# Patient Record
Sex: FEMALE | Race: ASIAN | NOT HISPANIC OR LATINO | Employment: UNEMPLOYED | URBAN - METROPOLITAN AREA
[De-identification: names, ages, dates, MRNs, and addresses within clinical notes are randomized per-mention and may not be internally consistent; named-entity substitution may affect disease eponyms.]

---

## 2017-08-19 ENCOUNTER — HOSPITAL ENCOUNTER (INPATIENT)
Facility: MEDICAL CENTER | Age: 32
LOS: 1 days | End: 2017-08-20
Attending: OBSTETRICS & GYNECOLOGY | Admitting: OBSTETRICS & GYNECOLOGY
Payer: COMMERCIAL

## 2017-08-19 LAB
BASOPHILS # BLD AUTO: 0.3 % (ref 0–1.8)
BASOPHILS # BLD: 0.05 K/UL (ref 0–0.12)
EOSINOPHIL # BLD AUTO: 0.1 K/UL (ref 0–0.51)
EOSINOPHIL NFR BLD: 0.6 % (ref 0–6.9)
ERYTHROCYTE [DISTWIDTH] IN BLOOD BY AUTOMATED COUNT: 41 FL (ref 35.9–50)
ERYTHROCYTE [DISTWIDTH] IN BLOOD BY AUTOMATED COUNT: 41 FL (ref 35.9–50)
HCT VFR BLD AUTO: 29 % (ref 37–47)
HCT VFR BLD AUTO: 33.9 % (ref 37–47)
HGB BLD-MCNC: 11 G/DL (ref 12–16)
HGB BLD-MCNC: 9.3 G/DL (ref 12–16)
HOLDING TUBE BB 8507: NORMAL
IMM GRANULOCYTES # BLD AUTO: 0.18 K/UL (ref 0–0.11)
IMM GRANULOCYTES NFR BLD AUTO: 1.1 % (ref 0–0.9)
LYMPHOCYTES # BLD AUTO: 3.04 K/UL (ref 1–4.8)
LYMPHOCYTES NFR BLD: 18.8 % (ref 22–41)
MCH RBC QN AUTO: 25.5 PG (ref 27–33)
MCH RBC QN AUTO: 25.6 PG (ref 27–33)
MCHC RBC AUTO-ENTMCNC: 32.1 G/DL (ref 33.6–35)
MCHC RBC AUTO-ENTMCNC: 32.4 G/DL (ref 33.6–35)
MCV RBC AUTO: 78.8 FL (ref 81.4–97.8)
MCV RBC AUTO: 79.5 FL (ref 81.4–97.8)
MONOCYTES # BLD AUTO: 0.87 K/UL (ref 0–0.85)
MONOCYTES NFR BLD AUTO: 5.4 % (ref 0–13.4)
NEUTROPHILS # BLD AUTO: 11.95 K/UL (ref 2–7.15)
NEUTROPHILS NFR BLD: 73.8 % (ref 44–72)
NRBC # BLD AUTO: 0 K/UL
NRBC BLD AUTO-RTO: 0 /100 WBC
PLATELET # BLD AUTO: 241 K/UL (ref 164–446)
PLATELET # BLD AUTO: 283 K/UL (ref 164–446)
PMV BLD AUTO: 9.5 FL (ref 9–12.9)
PMV BLD AUTO: 9.7 FL (ref 9–12.9)
RBC # BLD AUTO: 3.65 M/UL (ref 4.2–5.4)
RBC # BLD AUTO: 4.3 M/UL (ref 4.2–5.4)
WBC # BLD AUTO: 16.2 K/UL (ref 4.8–10.8)
WBC # BLD AUTO: 16.2 K/UL (ref 4.8–10.8)

## 2017-08-19 PROCEDURE — 0KQM0ZZ REPAIR PERINEUM MUSCLE, OPEN APPROACH: ICD-10-PCS | Performed by: OBSTETRICS & GYNECOLOGY

## 2017-08-19 PROCEDURE — 700101 HCHG RX REV CODE 250

## 2017-08-19 PROCEDURE — 700111 HCHG RX REV CODE 636 W/ 250 OVERRIDE (IP)

## 2017-08-19 PROCEDURE — 700112 HCHG RX REV CODE 229: Performed by: OBSTETRICS & GYNECOLOGY

## 2017-08-19 PROCEDURE — A9270 NON-COVERED ITEM OR SERVICE: HCPCS | Performed by: OBSTETRICS & GYNECOLOGY

## 2017-08-19 PROCEDURE — 85025 COMPLETE CBC W/AUTO DIFF WBC: CPT

## 2017-08-19 PROCEDURE — 36415 COLL VENOUS BLD VENIPUNCTURE: CPT

## 2017-08-19 PROCEDURE — 700102 HCHG RX REV CODE 250 W/ 637 OVERRIDE(OP): Performed by: OBSTETRICS & GYNECOLOGY

## 2017-08-19 PROCEDURE — 770002 HCHG ROOM/CARE - OB PRIVATE (112)

## 2017-08-19 PROCEDURE — 303615 HCHG EPIDURAL/SPINAL ANESTHESIA FOR LABOR

## 2017-08-19 PROCEDURE — 85027 COMPLETE CBC AUTOMATED: CPT

## 2017-08-19 PROCEDURE — 700111 HCHG RX REV CODE 636 W/ 250 OVERRIDE (IP): Performed by: OBSTETRICS & GYNECOLOGY

## 2017-08-19 PROCEDURE — 700105 HCHG RX REV CODE 258: Performed by: OBSTETRICS & GYNECOLOGY

## 2017-08-19 PROCEDURE — 59409 OBSTETRICAL CARE: CPT

## 2017-08-19 PROCEDURE — 304965 HCHG RECOVERY SERVICES

## 2017-08-19 PROCEDURE — 4A1HXCZ MONITORING OF PRODUCTS OF CONCEPTION, CARDIAC RATE, EXTERNAL APPROACH: ICD-10-PCS | Performed by: OBSTETRICS & GYNECOLOGY

## 2017-08-19 RX ORDER — VITAMIN A ACETATE, BETA CAROTENE, ASCORBIC ACID, CHOLECALCIFEROL, .ALPHA.-TOCOPHEROL ACETATE, DL-, THIAMINE MONONITRATE, RIBOFLAVIN, NIACINAMIDE, PYRIDOXINE HYDROCHLORIDE, FOLIC ACID, CYANOCOBALAMIN, CALCIUM CARBONATE, FERROUS FUMARATE, ZINC OXIDE, CUPRIC OXIDE 3080; 12; 120; 400; 1; 1.84; 3; 20; 22; 920; 25; 200; 27; 10; 2 [IU]/1; UG/1; MG/1; [IU]/1; MG/1; MG/1; MG/1; MG/1; MG/1; [IU]/1; MG/1; MG/1; MG/1; MG/1; MG/1
1 TABLET, FILM COATED ORAL EVERY MORNING
Status: DISCONTINUED | OUTPATIENT
Start: 2017-08-19 | End: 2017-08-20 | Stop reason: HOSPADM

## 2017-08-19 RX ORDER — SODIUM CHLORIDE, SODIUM LACTATE, POTASSIUM CHLORIDE, CALCIUM CHLORIDE 600; 310; 30; 20 MG/100ML; MG/100ML; MG/100ML; MG/100ML
INJECTION, SOLUTION INTRAVENOUS CONTINUOUS
Status: DISCONTINUED | OUTPATIENT
Start: 2017-08-19 | End: 2017-08-19 | Stop reason: HOSPADM

## 2017-08-19 RX ORDER — IBUPROFEN 600 MG/1
600 TABLET ORAL EVERY 6 HOURS PRN
Status: DISCONTINUED | OUTPATIENT
Start: 2017-08-19 | End: 2017-08-20 | Stop reason: HOSPADM

## 2017-08-19 RX ORDER — IBUPROFEN 600 MG/1
600 TABLET ORAL EVERY 6 HOURS PRN
Status: DISCONTINUED | OUTPATIENT
Start: 2017-08-19 | End: 2017-08-19

## 2017-08-19 RX ORDER — OXYCODONE HYDROCHLORIDE AND ACETAMINOPHEN 5; 325 MG/1; MG/1
1 TABLET ORAL EVERY 4 HOURS PRN
Status: DISCONTINUED | OUTPATIENT
Start: 2017-08-19 | End: 2017-08-20 | Stop reason: HOSPADM

## 2017-08-19 RX ORDER — SODIUM CHLORIDE, SODIUM LACTATE, POTASSIUM CHLORIDE, CALCIUM CHLORIDE 600; 310; 30; 20 MG/100ML; MG/100ML; MG/100ML; MG/100ML
INJECTION, SOLUTION INTRAVENOUS PRN
Status: DISCONTINUED | OUTPATIENT
Start: 2017-08-19 | End: 2017-08-20 | Stop reason: HOSPADM

## 2017-08-19 RX ORDER — MISOPROSTOL 200 UG/1
800 TABLET ORAL
Status: DISCONTINUED | OUTPATIENT
Start: 2017-08-19 | End: 2017-08-20 | Stop reason: HOSPADM

## 2017-08-19 RX ORDER — DOCUSATE SODIUM 100 MG/1
100 CAPSULE, LIQUID FILLED ORAL 2 TIMES DAILY PRN
Status: DISCONTINUED | OUTPATIENT
Start: 2017-08-19 | End: 2017-08-20 | Stop reason: HOSPADM

## 2017-08-19 RX ORDER — MISOPROSTOL 200 UG/1
800 TABLET ORAL
Status: DISCONTINUED | OUTPATIENT
Start: 2017-08-19 | End: 2017-08-19 | Stop reason: HOSPADM

## 2017-08-19 RX ORDER — OXYCODONE HYDROCHLORIDE AND ACETAMINOPHEN 5; 325 MG/1; MG/1
2 TABLET ORAL EVERY 4 HOURS PRN
Status: DISCONTINUED | OUTPATIENT
Start: 2017-08-19 | End: 2017-08-20 | Stop reason: HOSPADM

## 2017-08-19 RX ORDER — ROPIVACAINE HYDROCHLORIDE 2 MG/ML
INJECTION, SOLUTION EPIDURAL; INFILTRATION; PERINEURAL
Status: COMPLETED
Start: 2017-08-19 | End: 2017-08-19

## 2017-08-19 RX ADMIN — SODIUM CHLORIDE, POTASSIUM CHLORIDE, SODIUM LACTATE AND CALCIUM CHLORIDE: 600; 310; 30; 20 INJECTION, SOLUTION INTRAVENOUS at 03:30

## 2017-08-19 RX ADMIN — ROPIVACAINE HYDROCHLORIDE 100 ML: 2 INJECTION, SOLUTION EPIDURAL; INFILTRATION at 04:34

## 2017-08-19 RX ADMIN — OXYTOCIN 1 MILLI-UNITS/MIN: 10 INJECTION, SOLUTION INTRAMUSCULAR; INTRAVENOUS at 06:02

## 2017-08-19 RX ADMIN — OXYCODONE HYDROCHLORIDE AND ACETAMINOPHEN 1 TABLET: 5; 325 TABLET ORAL at 21:33

## 2017-08-19 RX ADMIN — FENTANYL CITRATE 100 MCG: 50 INJECTION, SOLUTION INTRAMUSCULAR; INTRAVENOUS at 03:49

## 2017-08-19 RX ADMIN — OXYCODONE HYDROCHLORIDE AND ACETAMINOPHEN 1 TABLET: 5; 325 TABLET ORAL at 11:03

## 2017-08-19 RX ADMIN — IBUPROFEN 600 MG: 600 TABLET, FILM COATED ORAL at 21:32

## 2017-08-19 RX ADMIN — IBUPROFEN 600 MG: 600 TABLET, FILM COATED ORAL at 11:03

## 2017-08-19 RX ADMIN — SODIUM CHLORIDE, POTASSIUM CHLORIDE, SODIUM LACTATE AND CALCIUM CHLORIDE: 600; 310; 30; 20 INJECTION, SOLUTION INTRAVENOUS at 04:36

## 2017-08-19 RX ADMIN — Medication 125 ML/HR: at 09:52

## 2017-08-19 RX ADMIN — DOCUSATE SODIUM 100 MG: 100 CAPSULE ORAL at 11:03

## 2017-08-19 RX ADMIN — Medication 1 TABLET: at 11:03

## 2017-08-19 ASSESSMENT — LIFESTYLE VARIABLES
EVER_SMOKED: NEVER
ALCOHOL_USE: NO
DO YOU DRINK ALCOHOL: NO

## 2017-08-19 ASSESSMENT — PAIN SCALES - GENERAL
PAINLEVEL_OUTOF10: 8
PAINLEVEL_OUTOF10: 0
PAINLEVEL_OUTOF10: 2
PAINLEVEL_OUTOF10: 8
PAINLEVEL_OUTOF10: 0

## 2017-08-19 NOTE — H&P
HISTORY OF PRESENT ILLNESS:  Patient is a private patient of Dr. Laquita Adamson,   a 31-year-old  2, para 1, who presents at 39-3/7 weeks gestation in   active labor.  Patient was pascual, but has been really changed her cervix   and so walk for an hour and then changed her cervix and was admitted.  She   received epidural anesthesia, she went on to become complete without   augmentation soon thereafter.  Group B strep is negative.    Her prenatal course was fairly uneventful.  She declined all screening, had a   normal survey, had a normal 1-hour Glucola.  Group B strep was negative.  She   received Tdap during in the prenatal course.    PAST MEDICAL HISTORY:  Only significant for acid reflux.    CURRENT MEDICATIONS:  Include only Tums and prenatal vitamins.    SOCIAL HISTORY:  She is .  No alcohol, tobacco or history of drug use.    GYNECOLOGIC HISTORY:  Noncontributory.  No history of herpes simplex virus.    OBSTETRICAL HISTORY:  She has had 1 spontaneous vaginal delivery, 6 pounds 11   ounces.  No complications.    PAST SURGICAL HISTORY:  None.    PHYSICAL EXAMINATION:  VITAL SIGNS:  Stable.  She is afebrile.  CARDIOVASCULAR:  Regular rate and rhythm.  CHEST:  Clear to auscultation bilaterally.  ABDOMEN:  Gravid, nontender, nondistended.  Normal bowel sounds.  EXTREMITIES:  No cyanosis, clubbing or edema.  PELVIC:  Sterile vaginal exam, she was 2 cm upon arrival and changed to 4 cm.    Fetal heart tones were firm to more reactive, reassuring, category 1,   moderate variability, no decelerations.  She was pascual about every 5   minutes.    LABORATORY DATA:  She is Rh positive, rubella is immune, RPR nonreactive,   hepatitis B surface antigen negative, HIV is nonreactive.  Group B strep is   negative.  One-hour Glucola was normal and she declined all  testing.    ASSESSMENT AND PLAN:  A 31-year-old  2, para 1 at 39+ weeks gestation:  1.  Active labor, she was admitted, desired  epidural.  Anticipate spontaneous   vaginal delivery.  Fetal tracing is overall reactive, reassuring.       ____________________________________     MD SERGE GONZALEZ / VALENTINA    DD:  08/19/2017 06:54:38  DT:  08/19/2017 07:08:31    D#:  [id number]  Job#:  [id number]

## 2017-08-19 NOTE — PROGRESS NOTES
0324 Assumed care of patient.     0420 Anesthesia called for epidural placement. Test dose given at 0426. Pt tolerated well, no complications noted at this time.     0619 Dr. De Los Santos called for delivery.     0621 Viable male infant delivered. APGARS 8/9. Mom and baby tolerated well. Resting at this time.

## 2017-08-19 NOTE — PROGRESS NOTES
EDC 8/23/2017  EGA 39w3d    0040: TOCO/US applied, vital signs WDL. Pt presents with c/o of contractions that started about   Pt declines any vaginal bleeding, LOF, and states +FM. SVE 2/100/-1.     0100: Dr. De Los Santos notified with labor status. Orders given to have patient walk the halls for an hour and recheck cervix after walking.   0127: Pt taken off monitors and oriented to areas to walk the hallways.   0236: SVE 4-5/100/-1  0240: Dr. De Los Santos updated with labor status. Orders given to admit patient. Labor orders given.   0320: Report given to GIORGIO Arroyo RN.

## 2017-08-19 NOTE — PROGRESS NOTES
@0820 Pt up to bathroom with standby assist - steady gait.  Pericare done, pads applied.  Pt to PP @0830 via w/c with all personal belongings.  Report to Sobia BLACK. pt. care cont.

## 2017-08-19 NOTE — DELIVERY NOTE
DATE OF SERVICE:  2017    PREOPERATIVE DIAGNOSES:  1.  Intrauterine pregnancy at term.  2.  Active labor.    POSTOPERATIVE DIAGNOSES:  1.  Intrauterine pregnancy at term.  2.  Active labor.    PROCEDURE:  Normal spontaneous vaginal delivery.    DELIVERING PHYSICIAN:  Leonor De Los Santos MD.    ANESTHESIA:  Epidural.    ANESTHESIOLOGIST:  Sid Fonseca DO.    COMPLICATIONS:  None.    ESTIMATED BLOOD LOSS:  250 mL    FINDINGS:  Male infant, cephalic presentation, clear amniotic fluid, nuchal   cord x1, loose and delivered through, Apgars 8 and 9.  Weight is still pending   at the time of this dictation.  Secondary midline laceration was repaired   with Vicryl.    HOSPITAL COURSE:  Patient arrived and once she changed her cervix, she was admitted.  She   received epidural anesthesia.  She went fairly quickly to complete.  She had   spontaneous rupture of membranes, clear fluid, and pushed for about 20 minutes   and delivered a vigorous male infant over a second-degree midline laceration.    Once the anterior shoulder delivered, the rest of the baby delivered   uneventfully.  There was a nuchal cord that was loose and reduced after   delivery.  Nose and mouth were bulb suctioned.  Baby was placed on maternal   abdomen with awaiting delivery team nurse.  Delayed for cord clamp.  The cord   was clamped x2 and cut by father of the baby.  The placenta delivered   spontaneously intact, 3-vessel cord.  Second-degree laceration was repaired   with Vicryl without any complications or difficulty.  Rectum and sphincter   intact.  Mother and baby are both doing well.  Mother will go to postpartum,   baby to  nursery.       ____________________________________     MD SERGE GONZALEZ / NTS    DD:  2017 06:56:46  DT:  2017 07:09:09    D#:  [id number]  Job#:  [id number]

## 2017-08-19 NOTE — IP AVS SNAPSHOT
Produce Run Access Code: Activation code not generated  Current Produce Run Status: Active    [employer]  A secure, online tool to manage your health information     [employer]’s Produce Run® is a secure, online tool that connects you to your personalized health information from the privacy of your home -- day or night - making it very easy for you to manage your healthcare. Once the activation process is completed, you can even access your medical information using the Produce Run alberto, which is available for free in the Apple Alberto store or Google Play store.     Produce Run provides the following levels of access (as shown below):   My Chart Features   [facility] Primary Care Doctor [facility]  Specialists [facility]  Urgent  Care Non-[facility]  Primary Care  Doctor   Email your healthcare team securely and privately 24/7 X X X X   Manage appointments: schedule your next appointment; view details of past/upcoming appointments X      Request prescription refills. X      View recent personal medical records, including lab and immunizations X X X X   View health record, including health history, allergies, medications X X X X   Read reports about your outpatient visits, procedures, consult and ER notes X X X X   See your discharge summary, which is a recap of your hospital and/or ER visit that includes your diagnosis, lab results, and care plan. X X       How to register for Produce Run:  1. Go to  [email or web address].  2. Click on the Sign Up Now box, which takes you to the New Member Sign Up page. You will need to provide the following information:  a. Enter your Produce Run Access Code exactly as it appears at the top of this page. (You will not need to use this code after you’ve completed the sign-up process. If you do not sign up before the expiration date, you must request a new code.)   b. Enter your date of birth.   c. Enter your home email address.   d. Click Submit, and follow the next screen’s instructions.  3. Create a Produce Run ID. This will  be your Logi-Serve login ID and cannot be changed, so think of one that is secure and easy to remember.  4. Create a Logi-Serve password. You can change your password at any time.  5. Enter your Password Reset Question and Answer. This can be used at a later time if you forget your password.   6. Enter your e-mail address. This allows you to receive e-mail notifications when new information is available in Logi-Serve.  7. Click Sign Up. You can now view your health information.    For assistance activating your Logi-Serve account, call [phone]

## 2017-08-19 NOTE — DELIVERY NOTE
H&P dictated.      Male  vtx  Clear fluid  apgars 8;/9  Weight pending  Intact placenta, 3vc  Ebl 250cc  No complications    Dictated.  sp

## 2017-08-19 NOTE — PROGRESS NOTES
Bedside report received from Krystina BLACK @ 0900. Cuddle and ID band verified. Oriented patient to the room, introduced the call light & skylight system. . Discussed plan of care. Assessment done. Denies pain. Encouraged to call if with need. Will check at intervals.

## 2017-08-19 NOTE — IP AVS SNAPSHOT
8/20/2017    Lela Olivas  [address]   [place] [address]    Dear Lela:    [facility] wants to ensure your discharge home is safe and you or your loved ones have had all of your questions answered regarding your care after you leave the hospital.    Below is a list of resources and contact information should you have any questions regarding your hospital stay, follow-up instructions, or active medical symptoms.    Questions or Concerns Regarding… Contact   Medical Questions Related to Your Discharge  (7 days a week, 8am-5pm) Contact a Nurse Care Coordinator   [phone]   Medical Questions Not Related to Your Discharge  (24 hours a day / 7 days a week)  Contact the Nurse Health Line   [phone]    Medications or Discharge Instructions Refer to your discharge packet   or contact your [facility] Primary Care Provider   [phone]   Follow-up Appointment(s) Schedule your appointment via ePantry   or contact Scheduling [phone]   Billing Review your statement via ePantry  or contact Billing [phone]   Medical Records Review your records via ePantry   or contact Medical Records [phone]     You may receive a telephone call within two days of discharge. This call is to make certain you understand your discharge instructions and have the opportunity to have any questions answered. You can also easily access your medical information, test results and upcoming appointments via the ePantry free online health management tool. You can learn more and sign up at [employer]/ePantry. For assistance setting up your ePantry account, please call [phone].    Once again, we want to ensure your discharge home is safe and that you have a clear understanding of any next steps in your care. If you have any questions or concerns, please do not hesitate to contact us, we are here for you. Thank you for choosing [facility] for your healthcare needs.    Sincerely,    Your [facility] Healthcare Team

## 2017-08-19 NOTE — IP AVS SNAPSHOT
Home Care Instructions                                                                                                                Lela Olivas   MRN: [id number]    Department:  POST PARTUM 31   2017           Follow-up Information     1. Follow up with Laquita Adamson M.D. In 6 weeks.    Specialty:  OB/Gyn    Contact information    [id number]  [place] [address]  [phone]         I assume responsibility for securing a follow-up  Screening blood test on my baby within the specified date range.    -                  Discharge Instructions       POSTPARTUM DISCHARGE INSTRUCTIONS FOR MOM    YOB: 1985   Age: 31 y.o.               Admit Date: 2017     Discharge Date: 2017  Attending Doctor:  Laquita Adamson M.D.                  Allergies:  Review of patient's allergies indicates no known allergies.    Discharged to home by car. Discharged via wheelchair, hospital escort: Yes.  Special equipment needed: Not Applicable  Belongings with: Personal  Be sure to schedule a follow-up appointment with your primary care doctor or any specialists as instructed.     Discharge Plan:   Diet Plan: Discussed  Activity Level: Discussed  Medication Reconciliation Updated: Yes  Influenza Vaccine Indication: Indicated: Not available from distributor/    REASONS TO CALL YOUR OBSTETRICIAN:  1.   Persistent fever or shaking chills (Temperature higher than 100.4)  2.   Heavy bleeding (soaking more than 1 pad per hour); Passing clots  3.   Foul odor from vagina  4.   Mastitis (Breast infection; breast pain, chills, fever, redness)  5.   Urinary pain, burning or frequency  6.   Episiotomy infection    8.   Severe depression longer than 24 hours    HAND WASHING  · Prior to handling the baby.  · Before breastfeeding or bottle feeding baby.  · After using the bathroom or changing the baby's diaper.    VAGINAL CARE  · Nothing inside vagina for 6 weeks: no sexual intercourse, tampons or  "douching.  · Bleeding may continue for 2-4 weeks.  Amount may vary.    · Call your physician for heavy bleeding which means soaking more than 1 pad per hour    BIRTH CONTROL  · It is possible to become pregnant at any time after delivery and while breastfeeding.  · Plan to discuss a method of birth control with your physician at your follow up visit. visit.    DIET AND ELIMINATION  · Eating more fiber (bran cereal, fruits, and vegetables) and drinking plenty of fluids will help to avoid constipation.  · Urinary frequency after childbirth is normal.    POSTPARTUM BLUES  During the first few days after birth, you may experience a sense of the \"blues\" which may include impatience, irritability or even crying.  These feeling come and go quickly.  However, as many as 1 in 10 women experience emotional symptoms known as postpartum depression.    Postpartum depression:  May start as early as the second or third day after delivery or take several weeks or months to develop.  Symptoms of \"blues\" are present, but are more intense:  Crying spells; loss of appetite; feelings of hopelessness or loss of control; fear of touching the baby; over concern or no concern at all about the baby; little or no concern about your own appearance/caring for yourself; and/or inability to sleep or excessive sleeping.  Contact your physician if you are experiencing any of these symptoms.    Crisis Hotline:  · [place] Crisis Hotline:  8-715-LFSXBRY  Or 1-549.861.4988  · [place] Crisis Hotline:  1-400.179.1491  Or 498-876-6116    PREVENTING SHAKEN BABY:  If you are angry or stressed, PUT THE BABY IN THE CRIB, step away, take some deep breaths, and wait until you are calm to care for the baby.  DO NOT SHAKE THE BABY.  You are not alone, call a supporter for help.    · Crisis Call Center 24/7 crisis line 481-433-4679 or 1-513.458.2295  · You can also text them, text \"ANSWER\" to 346718    QUIT SMOKING/TOBACCO USE:  I understand the use of any tobacco " products increases my chance of suffering from future heart disease and could cause other illnesses which may shorten my life. Quitting the use of tobacco products is the single most important thing I can do to improve my health. For further information on smoking / tobacco cessation call a Toll Free Quit Line at 1-815.175.3222 (*National Cancer [employer]) or 1-159.622.5092 (American Lung Association) or you can access the web based program at www.lungusa.org.    · [place] Tobacco Users Help Line:  (946) 192-8046       Toll Free: 1-435.464.6579  · Quit Tobacco Program [facility] (538)056-8353    DEPRESSION / SUICIDE RISK:  As you are discharged from this [facility], it is important to learn how to keep safe from harming yourself.    Recognize the warning signs:  · Abrupt changes in personality, positive or negative- including increase in energy   · Giving away possessions  · Change in eating patterns- significant weight changes-  positive or negative  · Change in sleeping patterns- unable to sleep or sleeping all the time   · Unwillingness or inability to communicate  · Depression  · Unusual sadness, discouragement and loneliness  · Talk of wanting to die  · Neglect of personal appearance   · Rebelliousness- reckless behavior  · Withdrawal from people/activities they love  · Confusion- inability to concentrate     If you or a loved one observes any of these behaviors or has concerns about self-harm, here's what you can do:  · Talk about it- your feelings and reasons for harming yourself  · Remove any means that you might use to hurt yourself (examples: pills, rope, extension cords, firearm)  · Get professional help from the community (Mental Health, Substance Abuse, psychological counseling)  · Do not be alone:Call your Safe Contact- someone whom you trust who will be there for you.  · Call your local CRISIS HOTLINE 927-7911 or 856-994-2036  · Call your local Children's Mobile  Crisis Response Team [place] (270) 091-6166 or www.AuctionPay  · Call the toll free National Suicide Prevention Hotlines   · National Suicide Prevention Lifeline 887-291-TSDN (7405)  · National Hope Line Network 800-SUICIDE (787-1110)    DISCHARGE SURVEY:  Thank you for choosing [facility].  We hope we provided you with very good care.  You may be receiving a survey in the mail.  Please fill it out.  Your opinion is valuable to us.    ADDITIONAL EDUCATIONAL MATERIALS GIVEN TO PATIENT:        My signature on this form indicates that:  1.  I have reviewed and understand the above information  2.  My questions regarding this information have been answered to my satisfaction.  3.  I have formulated a plan with my discharge nurse to obtain my prescribed medication for home.         Discharge Medication Instructions:    Below are the medications your physician expects you to take upon discharge:    Review all your home medications and newly ordered medications with your doctor and/or pharmacist. Follow medication instructions as directed by your doctor and/or pharmacist.    Please keep your medication list with you and share with your physician.               Medication List      START taking these medications        Instructions    Morning Afternoon Evening Bedtime     MG Caps   Last time this was given:  100 mg on 8/19/2017 11:03 AM        Take 100 mg by mouth 2 times a day as needed for Constipation.   Dose:  100 mg                          CHANGE how you take these medications        Instructions    Morning Afternoon Evening Bedtime    ibuprofen 600 MG Tabs   What changed:    - medication strength  - how much to take  - when to take this  - reasons to take this   Last time this was given:  600 mg on 8/20/2017  7:45 AM   Commonly known as:  MOTRIN        Take 1 Tab by mouth every 6 hours as needed (For cramping after delivery; do not give if patient is receiving ketorolac (Toradol)).   Dose:  600  mg                        oxycodone-acetaminophen 5-325 MG Tabs   What changed:    - how much to take  - reasons to take this   Last time this was given:  1 Tab on 8/20/2017  7:45 AM   Commonly known as:  PERCOCET        Take 1 Tab by mouth every four hours as needed for Moderate Pain.   Dose:  1 Tab                          CONTINUE taking these medications        Instructions    Morning Afternoon Evening Bedtime    PNV PO        Take 1 Tab by mouth.   Dose:  1 Tab                          STOP taking these medications     amoxicillin 500 MG Caps   Commonly known as:  AMOXIL               clotrimazole-betamethasone 1-0.05 % Crea   Commonly known as:  LOTRISONE               lidocaine viscous 2% 2 % Soln   Commonly known as:  XYLOCAINE               methylPREDNISolone 4 MG Tabs   Commonly known as:  MEDROL DOSPACK                    Where to Get Your Medications      Information about where to get these medications is not yet available     ! Ask your nurse or doctor about these medications    -  MG Caps  - ibuprofen 600 MG Tabs  - oxycodone-acetaminophen 5-325 MG Tabs            Crisis Hotline:     [place] Crisis Hotline:  3-396-EUUOPRR or 1-978.795.6971    [place] Crisis Hotline:    1-362.271.2546 or 072-890-0512        Disclaimer           _____________________________________                     __________       ________       Patient/Mother Signature or Legal                          Date                   Time

## 2017-08-20 VITALS
SYSTOLIC BLOOD PRESSURE: 81 MMHG | HEIGHT: 62 IN | DIASTOLIC BLOOD PRESSURE: 42 MMHG | RESPIRATION RATE: 18 BRPM | WEIGHT: 152 LBS | HEART RATE: 68 BPM | BODY MASS INDEX: 27.97 KG/M2 | OXYGEN SATURATION: 97 % | TEMPERATURE: 98.1 F

## 2017-08-20 PROCEDURE — 700102 HCHG RX REV CODE 250 W/ 637 OVERRIDE(OP): Performed by: OBSTETRICS & GYNECOLOGY

## 2017-08-20 PROCEDURE — 700112 HCHG RX REV CODE 229: Performed by: OBSTETRICS & GYNECOLOGY

## 2017-08-20 PROCEDURE — A9270 NON-COVERED ITEM OR SERVICE: HCPCS | Performed by: OBSTETRICS & GYNECOLOGY

## 2017-08-20 RX ORDER — PSEUDOEPHEDRINE HCL 30 MG
100 TABLET ORAL 2 TIMES DAILY PRN
Qty: 20 CAP | Refills: 1 | Status: SHIPPED | OUTPATIENT
Start: 2017-08-20 | End: 2018-09-25

## 2017-08-20 RX ORDER — IBUPROFEN 600 MG/1
600 TABLET ORAL EVERY 6 HOURS PRN
Qty: 30 TAB | Refills: 1 | Status: SHIPPED | OUTPATIENT
Start: 2017-08-20 | End: 2018-09-25

## 2017-08-20 RX ORDER — OXYCODONE HYDROCHLORIDE AND ACETAMINOPHEN 5; 325 MG/1; MG/1
1 TABLET ORAL EVERY 4 HOURS PRN
Qty: 15 TAB | Refills: 0 | Status: SHIPPED | OUTPATIENT
Start: 2017-08-20 | End: 2018-09-25

## 2017-08-20 RX ADMIN — OXYCODONE HYDROCHLORIDE AND ACETAMINOPHEN 1 TABLET: 5; 325 TABLET ORAL at 13:36

## 2017-08-20 RX ADMIN — IBUPROFEN 600 MG: 600 TABLET, FILM COATED ORAL at 07:45

## 2017-08-20 RX ADMIN — DOCUSATE SODIUM 100 MG: 100 CAPSULE ORAL at 13:34

## 2017-08-20 RX ADMIN — Medication 1 TABLET: at 07:44

## 2017-08-20 RX ADMIN — OXYCODONE HYDROCHLORIDE AND ACETAMINOPHEN 1 TABLET: 5; 325 TABLET ORAL at 07:45

## 2017-08-20 RX ADMIN — IBUPROFEN 600 MG: 600 TABLET, FILM COATED ORAL at 13:34

## 2017-08-20 ASSESSMENT — PAIN SCALES - GENERAL
PAINLEVEL_OUTOF10: 5
PAINLEVEL_OUTOF10: 2
PAINLEVEL_OUTOF10: 0
PAINLEVEL_OUTOF10: 4

## 2017-08-20 NOTE — PROGRESS NOTES
Pt assessment done, medicated per mar. POC discussed, pt will request pain medication as needed. FOB and infant at the bedside. All needs attended to, call light within reach.

## 2017-08-20 NOTE — PROGRESS NOTES
D/c instructions reviewed with pt, questions answered. Pt verbalized understanding of when to f/u with Dr. Adamson.

## 2017-08-20 NOTE — DISCHARGE INSTRUCTIONS
POSTPARTUM DISCHARGE INSTRUCTIONS FOR MOM    YOB: 1985   Age: 31 y.o.               Admit Date: 8/19/2017     Discharge Date: 8/20/2017  Attending Doctor:  Laquita Adamson M.D.                  Allergies:  Review of patient's allergies indicates no known allergies.    Discharged to home by car. Discharged via wheelchair, hospital escort: Yes.  Special equipment needed: Not Applicable  Belongings with: Personal  Be sure to schedule a follow-up appointment with your primary care doctor or any specialists as instructed.     Discharge Plan:   Diet Plan: Discussed  Activity Level: Discussed  Medication Reconciliation Updated: Yes  Influenza Vaccine Indication: Indicated: Not available from distributor/    REASONS TO CALL YOUR OBSTETRICIAN:  1.   Persistent fever or shaking chills (Temperature higher than 100.4)  2.   Heavy bleeding (soaking more than 1 pad per hour); Passing clots  3.   Foul odor from vagina  4.   Mastitis (Breast infection; breast pain, chills, fever, redness)  5.   Urinary pain, burning or frequency  6.   Episiotomy infection    8.   Severe depression longer than 24 hours    HAND WASHING  · Prior to handling the baby.  · Before breastfeeding or bottle feeding baby.  · After using the bathroom or changing the baby's diaper.    VAGINAL CARE  · Nothing inside vagina for 6 weeks: no sexual intercourse, tampons or douching.  · Bleeding may continue for 2-4 weeks.  Amount may vary.    · Call your physician for heavy bleeding which means soaking more than 1 pad per hour    BIRTH CONTROL  · It is possible to become pregnant at any time after delivery and while breastfeeding.  · Plan to discuss a method of birth control with your physician at your follow up visit. visit.    DIET AND ELIMINATION  · Eating more fiber (bran cereal, fruits, and vegetables) and drinking plenty of fluids will help to avoid constipation.  · Urinary frequency after childbirth is normal.    POSTPARTUM  "BLUES  During the first few days after birth, you may experience a sense of the \"blues\" which may include impatience, irritability or even crying.  These feeling come and go quickly.  However, as many as 1 in 10 women experience emotional symptoms known as postpartum depression.    Postpartum depression:  May start as early as the second or third day after delivery or take several weeks or months to develop.  Symptoms of \"blues\" are present, but are more intense:  Crying spells; loss of appetite; feelings of hopelessness or loss of control; fear of touching the baby; over concern or no concern at all about the baby; little or no concern about your own appearance/caring for yourself; and/or inability to sleep or excessive sleeping.  Contact your physician if you are experiencing any of these symptoms.    Crisis Hotline:  · [place] Crisis Hotline:  5-705-MFSWHPC  Or 1-946.844.4382  · [place] Crisis Hotline:  1-308.913.4774  Or 946-305-2516    PREVENTING SHAKEN BABY:  If you are angry or stressed, PUT THE BABY IN THE CRIB, step away, take some deep breaths, and wait until you are calm to care for the baby.  DO NOT SHAKE THE BABY.  You are not alone, call a supporter for help.    · Crisis Call Center 24/7 crisis line 357-495-0142 or 1-788.456.8864  · You can also text them, text \"ANSWER\" to 426341    QUIT SMOKING/TOBACCO USE:  I understand the use of any tobacco products increases my chance of suffering from future heart disease and could cause other illnesses which may shorten my life. Quitting the use of tobacco products is the single most important thing I can do to improve my health. For further information on smoking / tobacco cessation call a Toll Free Quit Line at 1-770.708.1268 (*National [employer] [place]) or 1-299.245.7018 (American Lung Association) or you can access the web based program at www.lungusa.org.    · [place] Tobacco Users Help Line:  (297) 432-6812       Toll Free: 1-423.954.9170  · Quit Tobacco " Program [facility] (956)577-7656    DEPRESSION / SUICIDE RISK:  As you are discharged from this [facility], it is important to learn how to keep safe from harming yourself.    Recognize the warning signs:  · Abrupt changes in personality, positive or negative- including increase in energy   · Giving away possessions  · Change in eating patterns- significant weight changes-  positive or negative  · Change in sleeping patterns- unable to sleep or sleeping all the time   · Unwillingness or inability to communicate  · Depression  · Unusual sadness, discouragement and loneliness  · Talk of wanting to die  · Neglect of personal appearance   · Rebelliousness- reckless behavior  · Withdrawal from people/activities they love  · Confusion- inability to concentrate     If you or a loved one observes any of these behaviors or has concerns about self-harm, here's what you can do:  · Talk about it- your feelings and reasons for harming yourself  · Remove any means that you might use to hurt yourself (examples: pills, rope, extension cords, firearm)  · Get professional help from the community (Mental Health, Substance Abuse, psychological counseling)  · Do not be alone:Call your Safe Contact- someone whom you trust who will be there for you.  · Call your local CRISIS HOTLINE 727-3177 or 040-781-5612  · Call your local Children's Mobile Crisis Response Team [place] (483) 736-7102 or www.Sleep Number  · Call the toll free National Suicide Prevention Hotlines   · National Suicide Prevention Lifeline 088-441-EDGH (0303)  · National Hope Line Network 800-SUICIDE (436-5492)    DISCHARGE SURVEY:  Thank you for choosing [facility].  We hope we provided you with very good care.  You may be receiving a survey in the mail.  Please fill it out.  Your opinion is valuable to us.    ADDITIONAL EDUCATIONAL MATERIALS GIVEN TO PATIENT:        My signature on this form indicates that:  1.  I have  reviewed and understand the above information  2.  My questions regarding this information have been answered to my satisfaction.  3.  I have formulated a plan with my discharge nurse to obtain my prescribed medication for home.

## 2017-08-20 NOTE — CONSULTS
Initial visit. Per RN request. MOB states infant is sleepy at the breast. Encouraged to unswaddle and go skin to skin with infant, change diaper to get infant awake prior to feeding. MOB positioned infant in football hold on left side. Increased height of pillows to allow MOB to bring infant to breast. Discussed waiting for infant to have a wide open gape prior to latching infant to ensure a deeper latch. Stated has pain, and took off infant, and relatched better. Showed MOB how to keep infant stimulated to suck for feeding, and how to hand express colostrum if infant remains sleepy. Discussed normal course of breastfeeding for 12-48 hours, and what to expect. Encouraged to call for lactation assistance as needed. Breastfeeding essentials pamphlet given and reviewed.

## 2017-08-20 NOTE — PROGRESS NOTES
Hospital Day : 1    S: doing well; desired dc    O:  Filed Vitals:    17 1000 17 1200 17 1545 17   BP: 101/50 105/63 88/40 100/58   Pulse: 85 75 89 81   Temp: 36.8 °C (98.2 °F) 36.8 °C (98.3 °F) 36.8 °C (98.3 °F) 37.1 °C (98.7 °F)   Resp: 20 20 20 18   Height:       Weight:       SpO2: 95% 98% 96% 96%       Recent Labs      17   0345  17   1637   WBC  16.2*  16.2*   RBC  4.30  3.65*   HEMOGLOBIN  11.0*  9.3*   HEMATOCRIT  33.9*  29.0*   MCV  78.8*  79.5*   MCH  25.6*  25.5*   MCHC  32.4*  32.1*   RDW  41.0  41.0   PLATELETCT  283  241   MPV  9.7  9.5             abd soft ff    A: pp 1 sp ; doing well    P: dc

## 2017-08-20 NOTE — CARE PLAN
Problem: Altered physiologic condition related to immediate post-delivery state and potential for bleeding/hemorrhage  Goal: Patient physiologically stable as evidenced by normal lochia, palpable uterine involution and vital signs within normal limits  Outcome: PROGRESSING AS EXPECTED  Fundus firm. Lochia light. Vital signs WDL.     Problem: Alteration in comfort related to episiotomy, vaginal repair and/or after birth pains  Goal: Patient is able to ambulate, care for self and infant  Outcome: PROGRESSING AS EXPECTED  Pt able to ambulate, care for self and infant. Pt states her pain is controlled with prn pain medication. Pt's pain reassessed throughout this shift.

## 2017-08-20 NOTE — PROGRESS NOTES
Assessment completed. WDL. Vital signs stable. Patient progressing according to plan of care. Patient up and ambulating. Pt states she is voiding without difficulty. Patient claims to have good pain relief with prn pain medications. Pt states she will call when she needs prn pain medication. Pt denies any other issues at this time. Educated pt about feeding infant every 2-3 hours or when infant is showing cues. Pt encouraged to call for next feed to assess latch. Pt encouraged to call for any needs.

## 2017-11-21 ENCOUNTER — OFFICE VISIT (OUTPATIENT)
Dept: URGENT CARE | Facility: CLINIC | Age: 32
End: 2017-11-21
Payer: COMMERCIAL

## 2017-11-21 VITALS
TEMPERATURE: 97.4 F | HEART RATE: 70 BPM | WEIGHT: 144 LBS | OXYGEN SATURATION: 99 % | SYSTOLIC BLOOD PRESSURE: 100 MMHG | HEIGHT: 62 IN | RESPIRATION RATE: 20 BRPM | BODY MASS INDEX: 26.5 KG/M2 | DIASTOLIC BLOOD PRESSURE: 70 MMHG

## 2017-11-21 DIAGNOSIS — J06.9 VIRAL UPPER RESPIRATORY ILLNESS: ICD-10-CM

## 2017-11-21 DIAGNOSIS — M94.0 COSTOCHONDRITIS, ACUTE: ICD-10-CM

## 2017-11-21 DIAGNOSIS — R05.9 COUGH: ICD-10-CM

## 2017-11-21 PROCEDURE — 99214 OFFICE O/P EST MOD 30 MIN: CPT | Performed by: PHYSICIAN ASSISTANT

## 2017-11-21 RX ORDER — NAPROXEN 500 MG/1
500 TABLET ORAL 2 TIMES DAILY WITH MEALS
Qty: 14 TAB | Refills: 0 | Status: SHIPPED | OUTPATIENT
Start: 2017-11-21 | End: 2018-09-25

## 2017-11-22 ASSESSMENT — ENCOUNTER SYMPTOMS
CONSTIPATION: 0
CHILLS: 0
COUGH: 1
ABDOMINAL PAIN: 0
WHEEZING: 0
SORE THROAT: 1
SHORTNESS OF BREATH: 0
SPUTUM PRODUCTION: 0
HEMOPTYSIS: 0
VOMITING: 0
HEADACHES: 0
DIARRHEA: 0
RHINORRHEA: 1
EYES NEGATIVE: 1
FEVER: 0
MYALGIAS: 0
NAUSEA: 0

## 2017-11-22 NOTE — PROGRESS NOTES
"Subjective:      Lela Olivas is a 31 y.o. female who presents with Cough (chest thightness, chest pain)            Sick x 3 days with cough, congestion, pain in throat/chest/back with coughing.        Cough   This is a new problem. Episode onset: 3 days. The problem has been unchanged. The problem occurs constantly. The cough is non-productive. Associated symptoms include chest pain, nasal congestion, rhinorrhea and a sore throat. Pertinent negatives include no chills, ear congestion, ear pain, fever, headaches, hemoptysis, myalgias, shortness of breath or wheezing. She has tried nothing for the symptoms. The treatment provided no relief. Currently Lactating       Review of Systems   Constitutional: Negative for chills, fever and malaise/fatigue.   HENT: Positive for congestion, rhinorrhea and sore throat. Negative for ear pain.    Eyes: Negative.    Respiratory: Positive for cough. Negative for hemoptysis, sputum production, shortness of breath and wheezing.    Cardiovascular: Positive for chest pain.   Gastrointestinal: Negative for abdominal pain, constipation, diarrhea, nausea and vomiting.   Musculoskeletal: Negative for myalgias.   Neurological: Negative for headaches.          Objective:     /70   Pulse 70   Temp 36.3 °C (97.4 °F)   Resp 20   Ht 1.575 m (5' 2\")   Wt 65.3 kg (144 lb)   SpO2 99%   Breastfeeding? Yes   BMI 26.34 kg/m²      Physical Exam   Constitutional: She is oriented to person, place, and time. She appears well-developed and well-nourished.   HENT:   Head: Normocephalic and atraumatic.   Right Ear: External ear normal.   Left Ear: External ear normal.   Mouth/Throat: Oropharynx is clear and moist.   Eyes: Conjunctivae and EOM are normal. Pupils are equal, round, and reactive to light.   Neck: Normal range of motion. Neck supple.   Cardiovascular: Normal rate, regular rhythm, normal heart sounds and intact distal pulses.    Pulmonary/Chest: Effort normal and breath sounds " normal. No respiratory distress. She has no wheezes. She has no rales.   Neurological: She is alert and oriented to person, place, and time.   Skin: Skin is warm and dry.   Psychiatric: She has a normal mood and affect. Her behavior is normal. Judgment and thought content normal.   Nursing note and vitals reviewed.              Assessment/Plan:     1. Costochondritis, acute  Patient is currently breastfeeding.  Will treat symptoms with OTC Delsym or Robitussin plus Naproxen for likely costochondritis.  Patient is well appearing, stable vital signs, lungs are CTA bilaterally.  Discomfort of the chest is only with coughing, reassuring not cardiac related discomfort.  Follow-up if symptoms change, get worse or new symptoms develop.    - naproxen (NAPROSYN) 500 MG Tab; Take 1 Tab by mouth 2 times a day, with meals.  Dispense: 14 Tab; Refill: 0    2. Cough      3. Viral upper respiratory illness

## 2018-03-14 ENCOUNTER — APPOINTMENT (OUTPATIENT)
Dept: RADIOLOGY | Facility: IMAGING CENTER | Age: 33
End: 2018-03-14
Attending: PHYSICIAN ASSISTANT
Payer: COMMERCIAL

## 2018-03-14 ENCOUNTER — OFFICE VISIT (OUTPATIENT)
Dept: URGENT CARE | Facility: CLINIC | Age: 33
End: 2018-03-14
Payer: COMMERCIAL

## 2018-03-14 VITALS
OXYGEN SATURATION: 100 % | DIASTOLIC BLOOD PRESSURE: 54 MMHG | WEIGHT: 151.46 LBS | BODY MASS INDEX: 27.87 KG/M2 | TEMPERATURE: 97.4 F | HEIGHT: 62 IN | HEART RATE: 70 BPM | SYSTOLIC BLOOD PRESSURE: 100 MMHG | RESPIRATION RATE: 16 BRPM

## 2018-03-14 DIAGNOSIS — R07.89 ATYPICAL CHEST PAIN: ICD-10-CM

## 2018-03-14 PROCEDURE — 71046 X-RAY EXAM CHEST 2 VIEWS: CPT | Mod: TC | Performed by: PHYSICIAN ASSISTANT

## 2018-03-14 PROCEDURE — 99214 OFFICE O/P EST MOD 30 MIN: CPT | Performed by: PHYSICIAN ASSISTANT

## 2018-03-14 ASSESSMENT — ENCOUNTER SYMPTOMS
EXERTIONAL CHEST PRESSURE: 0
FATIGUE: 1
BACK PAIN: 1
RESPIRATORY NEGATIVE: 1
NEUROLOGICAL NEGATIVE: 1
PALPITATIONS: 0
LOWER EXTREMITY EDEMA: 0
NECK PAIN: 1
SHORTNESS OF BREATH: 0
SYNCOPE: 0
EYES NEGATIVE: 1
NAUSEA: 1
IRREGULAR HEARTBEAT: 0

## 2018-03-15 ENCOUNTER — HOSPITAL ENCOUNTER (OUTPATIENT)
Dept: LAB | Facility: MEDICAL CENTER | Age: 33
End: 2018-03-15
Attending: PHYSICIAN ASSISTANT
Payer: COMMERCIAL

## 2018-03-15 DIAGNOSIS — R07.89 ATYPICAL CHEST PAIN: ICD-10-CM

## 2018-03-15 LAB
ALBUMIN SERPL BCP-MCNC: 4.4 G/DL (ref 3.2–4.9)
ALBUMIN/GLOB SERPL: 1.5 G/DL
ALP SERPL-CCNC: 96 U/L (ref 30–99)
ALT SERPL-CCNC: 15 U/L (ref 2–50)
ANION GAP SERPL CALC-SCNC: 9 MMOL/L (ref 0–11.9)
AST SERPL-CCNC: 19 U/L (ref 12–45)
BASOPHILS # BLD AUTO: 0.4 % (ref 0–1.8)
BASOPHILS # BLD: 0.04 K/UL (ref 0–0.12)
BILIRUB SERPL-MCNC: 0.4 MG/DL (ref 0.1–1.5)
BUN SERPL-MCNC: 10 MG/DL (ref 8–22)
CALCIUM SERPL-MCNC: 9.2 MG/DL (ref 8.5–10.5)
CHLORIDE SERPL-SCNC: 107 MMOL/L (ref 96–112)
CO2 SERPL-SCNC: 21 MMOL/L (ref 20–33)
CREAT SERPL-MCNC: 0.75 MG/DL (ref 0.5–1.4)
DEPRECATED D DIMER PPP IA-ACNC: 208 NG/ML(D-DU)
EOSINOPHIL # BLD AUTO: 0.1 K/UL (ref 0–0.51)
EOSINOPHIL NFR BLD: 1.1 % (ref 0–6.9)
ERYTHROCYTE [DISTWIDTH] IN BLOOD BY AUTOMATED COUNT: 43.3 FL (ref 35.9–50)
GLOBULIN SER CALC-MCNC: 3 G/DL (ref 1.9–3.5)
GLUCOSE SERPL-MCNC: 112 MG/DL (ref 65–99)
HCT VFR BLD AUTO: 40.9 % (ref 37–47)
HGB BLD-MCNC: 13.3 G/DL (ref 12–16)
IMM GRANULOCYTES # BLD AUTO: 0.02 K/UL (ref 0–0.11)
IMM GRANULOCYTES NFR BLD AUTO: 0.2 % (ref 0–0.9)
LYMPHOCYTES # BLD AUTO: 3.35 K/UL (ref 1–4.8)
LYMPHOCYTES NFR BLD: 37.3 % (ref 22–41)
MCH RBC QN AUTO: 26.8 PG (ref 27–33)
MCHC RBC AUTO-ENTMCNC: 32.5 G/DL (ref 33.6–35)
MCV RBC AUTO: 82.3 FL (ref 81.4–97.8)
MONOCYTES # BLD AUTO: 0.41 K/UL (ref 0–0.85)
MONOCYTES NFR BLD AUTO: 4.6 % (ref 0–13.4)
NEUTROPHILS # BLD AUTO: 5.06 K/UL (ref 2–7.15)
NEUTROPHILS NFR BLD: 56.4 % (ref 44–72)
NRBC # BLD AUTO: 0 K/UL
NRBC BLD-RTO: 0 /100 WBC
PLATELET # BLD AUTO: 295 K/UL (ref 164–446)
PMV BLD AUTO: 10.1 FL (ref 9–12.9)
POTASSIUM SERPL-SCNC: 3.7 MMOL/L (ref 3.6–5.5)
PROT SERPL-MCNC: 7.4 G/DL (ref 6–8.2)
RBC # BLD AUTO: 4.97 M/UL (ref 4.2–5.4)
SODIUM SERPL-SCNC: 137 MMOL/L (ref 135–145)
TSH SERPL DL<=0.005 MIU/L-ACNC: 0.97 UIU/ML (ref 0.38–5.33)
WBC # BLD AUTO: 9 K/UL (ref 4.8–10.8)

## 2018-03-15 PROCEDURE — 85025 COMPLETE CBC W/AUTO DIFF WBC: CPT

## 2018-03-15 PROCEDURE — 36415 COLL VENOUS BLD VENIPUNCTURE: CPT

## 2018-03-15 PROCEDURE — 85379 FIBRIN DEGRADATION QUANT: CPT

## 2018-03-15 PROCEDURE — 80053 COMPREHEN METABOLIC PANEL: CPT

## 2018-03-15 PROCEDURE — 82552 ASSAY OF CPK IN BLOOD: CPT

## 2018-03-15 PROCEDURE — 84443 ASSAY THYROID STIM HORMONE: CPT

## 2018-03-15 PROCEDURE — 82550 ASSAY OF CK (CPK): CPT

## 2018-03-15 NOTE — PROGRESS NOTES
"Subjective:      Lela Olivas is a 32 y.o. female who presents with Chest Pain (xmonths, squeezing sensation); Back Pain; Neck Pain; Fatigue; and Nausea            Chest Pain    This is a chronic (several mos chest press; is preg) problem. The current episode started more than 1 month ago. The onset quality is undetermined. The problem occurs intermittently. The problem has been unchanged. The pain is mild. The quality of the pain is described as pressure. The pain does not radiate. Associated symptoms include back pain and nausea. Pertinent negatives include no exertional chest pressure, irregular heartbeat, lower extremity edema, palpitations, shortness of breath or syncope. The pain is aggravated by nothing. She has tried nothing for the symptoms. The treatment provided mild relief. There are no known risk factors.   Pertinent negatives for past medical history include no anxiety/panic attacks, no CAD and no PE.   Back Pain   Associated symptoms include chest pain.   Neck Pain    Associated symptoms include chest pain. Pertinent negatives include no syncope.   Fatigue   Associated symptoms include chest pain, fatigue, nausea and neck pain.   Nausea   Associated symptoms include chest pain, fatigue, nausea and neck pain.       Review of Systems   Constitutional: Positive for fatigue.   HENT: Negative.    Eyes: Negative.    Respiratory: Negative.  Negative for shortness of breath.    Cardiovascular: Positive for chest pain. Negative for palpitations and syncope.   Gastrointestinal: Positive for nausea.   Genitourinary: Negative.    Musculoskeletal: Positive for back pain and neck pain.   Skin: Negative.    Neurological: Negative.           Objective:     /54   Pulse 70   Temp 36.3 °C (97.4 °F)   Resp 16   Ht 1.575 m (5' 2\")   Wt 68.7 kg (151 lb 7.3 oz)   SpO2 100%   BMI 27.70 kg/m²      Physical Exam   Constitutional: She is oriented to person, place, and time. She appears well-developed and " well-nourished. No distress.   HENT:   Head: Normocephalic and atraumatic.   Neck: Normal range of motion. Neck supple. No thyromegaly present.   Cardiovascular: Normal rate.    Pulmonary/Chest: Effort normal and breath sounds normal. No respiratory distress. She has no wheezes.   Neurological: She is alert and oriented to person, place, and time. No cranial nerve deficit. She exhibits normal muscle tone. Coordination normal.   Skin: Skin is warm and dry.   Psychiatric: She has a normal mood and affect. Her behavior is normal.   Nursing note and vitals reviewed.    Active Ambulatory Problems     Diagnosis Date Noted   • No Active Ambulatory Problems     Resolved Ambulatory Problems     Diagnosis Date Noted   • No Resolved Ambulatory Problems     Past Medical History:   Diagnosis Date   • No known health problems      Current Outpatient Prescriptions on File Prior to Visit   Medication Sig Dispense Refill   • naproxen (NAPROSYN) 500 MG Tab Take 1 Tab by mouth 2 times a day, with meals. 14 Tab 0   • oxycodone-acetaminophen (PERCOCET) 5-325 MG Tab Take 1 Tab by mouth every four hours as needed for Moderate Pain. 15 Tab 0   • ibuprofen (MOTRIN) 600 MG Tab Take 1 Tab by mouth every 6 hours as needed (For cramping after delivery; do not give if patient is receiving ketorolac (Toradol)). 30 Tab 1   • docusate sodium 100 MG Cap Take 100 mg by mouth 2 times a day as needed for Constipation. 20 Cap 1   • Prenatal Vit w/Dl-Mcyupcvhe-KJ (PNV PO) Take 1 Tab by mouth.       No current facility-administered medications on file prior to visit.      Social History     Social History   • Marital status:      Spouse name: N/A   • Number of children: N/A   • Years of education: N/A     Occupational History   • Not on file.     Social History Main Topics   • Smoking status: Never Smoker   • Smokeless tobacco: Never Used   • Alcohol use No   • Drug use: No   • Sexual activity: Not on file     Other Topics Concern   • Not on file      Social History Narrative   • No narrative on file     History reviewed. No pertinent family history.  Patient has no known allergies.       ekg = rsr w/o ectopy or s-t changes     Assessment/Plan:     ·  atypical CP several months; MVP? Vs other cardiac vs pulmon./chest wall      I don't see an emergent ER indic tonight [sx have been off/on for several months; although, more freq this wk] but pt was told specifically to go to ER if sx worsen while workup and referral are being done; I will order some labs and get pt f/u asap  · Labs; [consider ECHO [MVP or ? Valve/pericarditis/endocarditis etc.] or HOLTER [since sx are now almost daily] ] f/u tests  · F/u PCP asap

## 2018-03-18 LAB
CK BB CFR SERPL ELPH: 0 % (ref 0–0)
CK MACRO1 CFR SERPL: 0 % (ref 0–0)
CK MACRO2 CFR SERPL: 0 % (ref 0–0)
CK MB CFR SERPL ELPH: 0 % (ref 0–4)
CK MM CFR SERPL ELPH: 100 % (ref 96–100)
CK SERPL-CCNC: 102 U/L (ref 20–180)

## 2018-04-16 ENCOUNTER — OFFICE VISIT (OUTPATIENT)
Dept: URGENT CARE | Facility: CLINIC | Age: 33
End: 2018-04-16
Payer: COMMERCIAL

## 2018-04-16 VITALS
HEIGHT: 62 IN | TEMPERATURE: 97.8 F | HEART RATE: 74 BPM | SYSTOLIC BLOOD PRESSURE: 102 MMHG | DIASTOLIC BLOOD PRESSURE: 64 MMHG | WEIGHT: 150 LBS | BODY MASS INDEX: 27.6 KG/M2 | OXYGEN SATURATION: 98 % | RESPIRATION RATE: 14 BRPM

## 2018-04-16 DIAGNOSIS — J06.9 PROTRACTED URI: ICD-10-CM

## 2018-04-16 PROCEDURE — 99214 OFFICE O/P EST MOD 30 MIN: CPT | Performed by: PHYSICIAN ASSISTANT

## 2018-04-16 RX ORDER — AZITHROMYCIN 250 MG/1
TABLET, FILM COATED ORAL
Qty: 6 TAB | Refills: 0 | Status: SHIPPED | OUTPATIENT
Start: 2018-04-16 | End: 2018-09-25

## 2018-04-16 ASSESSMENT — ENCOUNTER SYMPTOMS
SENSORY CHANGE: 0
HEADACHES: 1
HEMOPTYSIS: 0
ABDOMINAL PAIN: 0
NAUSEA: 0
SPUTUM PRODUCTION: 0
SORE THROAT: 1
CHILLS: 1
FEVER: 1
TINGLING: 0
VOMITING: 0
SHORTNESS OF BREATH: 0
COUGH: 1
SINUS PAIN: 0
MYALGIAS: 1
FOCAL WEAKNESS: 0
PALPITATIONS: 0
DIARRHEA: 0
RHINORRHEA: 1
WHEEZING: 0

## 2018-04-16 NOTE — LETTER
April 16, 2018         Patient: Lela Olivas   YOB: 1985   Date of Visit: 4/16/2018           To Whom it May Concern:    Lela Olivas was seen in my clinic on 4/16/2018. She is excused from school for last week up until 4/17/18 for medical illness. She may return to school on 4/18/18 if feeling better.    If you have any questions or concerns, please don't hesitate to call.        Sincerely,           Nicky Sutherland P.A.-C.  Electronically Signed

## 2018-04-17 NOTE — PROGRESS NOTES
"Subjective:      Lela Olivas is a 32 y.o. female who presents with URI (uri symptoms  x 1 week . needs school note . pt is breast feeding .)            Cough   This is a new problem. Episode onset: > 1 week  The problem has been unchanged. The cough is non-productive. Associated symptoms include chills, a fever (102F 2 days ), headaches, myalgias, nasal congestion, rhinorrhea and a sore throat. Pertinent negatives include no chest pain, ear congestion, ear pain, hemoptysis, postnasal drip, rash, shortness of breath or wheezing. Treatments tried: vicks, tylenol, allergy medication. The treatment provided mild relief. There is no history of asthma, bronchitis or pneumonia.       Past Medical History:   Diagnosis Date   • No known health problems        Past Surgical History:   Procedure Laterality Date   • OTHER      cyst removed       No family history on file.    No Known Allergies    Medications, Allergies, and current problem list reviewed today in Epic      Review of Systems   Constitutional: Positive for chills, fever (102F 2 days ) and malaise/fatigue.   HENT: Positive for congestion, rhinorrhea and sore throat. Negative for ear discharge, ear pain, postnasal drip and sinus pain.    Respiratory: Positive for cough. Negative for hemoptysis, sputum production, shortness of breath and wheezing.    Cardiovascular: Negative for chest pain, palpitations and leg swelling.   Gastrointestinal: Negative for abdominal pain, diarrhea, nausea and vomiting.   Musculoskeletal: Positive for myalgias.   Skin: Negative for rash.   Neurological: Positive for headaches. Negative for tingling, sensory change and focal weakness.     All other systems reviewed and are negative.        Objective:     /64   Pulse 74   Temp 36.6 °C (97.8 °F)   Resp 14   Ht 1.575 m (5' 2\")   Wt 68 kg (150 lb)   LMP 04/02/2018   SpO2 98%   Breastfeeding? Yes   BMI 27.44 kg/m²      Physical Exam   Constitutional: She is oriented to " person, place, and time. She appears well-developed and well-nourished. No distress.   HENT:   Head: Normocephalic and atraumatic.   Right Ear: Tympanic membrane, external ear and ear canal normal.   Left Ear: Tympanic membrane, external ear and ear canal normal.   Nose: Mucosal edema present. Right sinus exhibits maxillary sinus tenderness. Left sinus exhibits maxillary sinus tenderness.   Mouth/Throat: Uvula is midline, oropharynx is clear and moist and mucous membranes are normal.   Eyes: Conjunctivae are normal.   Neck: Neck supple.   Cardiovascular: Normal rate, regular rhythm and normal heart sounds.  Exam reveals no gallop and no friction rub.    No murmur heard.  Pulmonary/Chest: Effort normal. No respiratory distress. She has no wheezes. She has no rales.   Deep spasmodic cough    Neurological: She is alert and oriented to person, place, and time. No cranial nerve deficit.   Skin: Skin is warm and dry. No rash noted.   Psychiatric: She has a normal mood and affect. Her behavior is normal. Judgment and thought content normal.               Assessment/Plan:     1. Protracted URI  azithromycin (ZITHROMAX) 250 MG Tab       Current Outpatient Prescriptions:   •  azithromycin (ZITHROMAX) 250 MG Tab, Take as directed on package., Disp: 6 Tab, Rfl: 0    Patient is breast-feeding  Recommending pumping and dumping while supplementing with formula.  Side effects and benefits of medication reviewed. Monitor baby for rash, GI symptoms, thrush if she chooses to breastfeed.       Differential diagnoses, Supportive care, and indications for immediate follow-up discussed with patient.   Instructed to return to clinic or nearest emergency department for any change in condition, further concerns, or worsening of symptoms.    The patient demonstrated a good understanding and agreed with the treatment plan.    Nicky Sutherland P.A.-C.

## 2018-06-08 ENCOUNTER — TELEPHONE (OUTPATIENT)
Dept: URGENT CARE | Facility: CLINIC | Age: 33
End: 2018-06-08

## 2018-06-08 ENCOUNTER — HOSPITAL ENCOUNTER (OUTPATIENT)
Facility: MEDICAL CENTER | Age: 33
End: 2018-06-08
Attending: FAMILY MEDICINE
Payer: COMMERCIAL

## 2018-06-08 ENCOUNTER — OFFICE VISIT (OUTPATIENT)
Dept: URGENT CARE | Facility: CLINIC | Age: 33
End: 2018-06-08
Payer: COMMERCIAL

## 2018-06-08 VITALS
DIASTOLIC BLOOD PRESSURE: 60 MMHG | SYSTOLIC BLOOD PRESSURE: 120 MMHG | TEMPERATURE: 98.4 F | OXYGEN SATURATION: 97 % | HEIGHT: 62 IN | HEART RATE: 68 BPM | RESPIRATION RATE: 16 BRPM

## 2018-06-08 DIAGNOSIS — N10 ACUTE PYELONEPHRITIS: ICD-10-CM

## 2018-06-08 LAB
APPEARANCE UR: CLEAR
BILIRUB UR STRIP-MCNC: NEGATIVE MG/DL
COLOR UR AUTO: YELLOW
GLUCOSE UR STRIP.AUTO-MCNC: NEGATIVE MG/DL
INT CON NEG: NORMAL
INT CON POS: NORMAL
KETONES UR STRIP.AUTO-MCNC: NEGATIVE MG/DL
LEUKOCYTE ESTERASE UR QL STRIP.AUTO: NORMAL
NITRITE UR QL STRIP.AUTO: NEGATIVE
PH UR STRIP.AUTO: 6 [PH] (ref 5–8)
POC URINE PREGNANCY TEST: NEGATIVE
PROT UR QL STRIP: NEGATIVE MG/DL
RBC UR QL AUTO: NEGATIVE
SP GR UR STRIP.AUTO: 1.01
UROBILINOGEN UR STRIP-MCNC: NEGATIVE MG/DL

## 2018-06-08 PROCEDURE — 81025 URINE PREGNANCY TEST: CPT | Performed by: FAMILY MEDICINE

## 2018-06-08 PROCEDURE — 87086 URINE CULTURE/COLONY COUNT: CPT

## 2018-06-08 PROCEDURE — 81002 URINALYSIS NONAUTO W/O SCOPE: CPT | Performed by: FAMILY MEDICINE

## 2018-06-08 PROCEDURE — 99214 OFFICE O/P EST MOD 30 MIN: CPT | Performed by: FAMILY MEDICINE

## 2018-06-08 RX ORDER — CIPROFLOXACIN 500 MG/1
500 TABLET, FILM COATED ORAL 2 TIMES DAILY
Qty: 14 TAB | Refills: 0 | Status: SHIPPED | OUTPATIENT
Start: 2018-06-08 | End: 2018-06-15

## 2018-06-09 DIAGNOSIS — R10.9 FLANK PAIN: ICD-10-CM

## 2018-06-09 NOTE — TELEPHONE ENCOUNTER
Unable to Document Rocephin shot on MAR, NDC number for lidocaine 1% is not on system.    Patient received 1 gram of rocephin lot# [id number] exp 08/01/2020 [id number].  Lidocain 1% lot # [id number] exp 02/2021 NDC 87975-835-26.   500 mg on Rt. dorsolgulteal and 500 mg on Lt. dorsolgluteal

## 2018-06-10 NOTE — PROGRESS NOTES
Subjective:     Chief Complaint   Patient presents with   • Abdominal Pain     x over1 month and worse x 3 days, Rt. lower abdominal pain, pain travel around Rt. lower back                 Abdominal Pain  This is a new problem. The current episode started 1 mth ago, but acutely worse today. The onset quality is sudden. The problem occurs constantly. The pain is unchanged. The pain is located in the suprapubic region. The pain is mild. The quality of the pain is described as aching. The pain does radiate to rt flank area. Associated symptoms include: none. Pertinent negatives include no belching, constipation, diarrhea, dysuria, fever, hematochezia, hematuria, nausea or sore throat. Nothing relieves the symptoms. Past treatments include nothing.     Social History   Substance Use Topics   • Smoking status: Never Smoker   • Smokeless tobacco: Never Used   • Alcohol use No           Current Outpatient Prescriptions on File Prior to Visit   Medication Sig Dispense Refill   • Acetaminophen (TYLENOL PO) Take  by mouth.     • azithromycin (ZITHROMAX) 250 MG Tab Take as directed on package. 6 Tab 0   • naproxen (NAPROSYN) 500 MG Tab Take 1 Tab by mouth 2 times a day, with meals. 14 Tab 0   • oxycodone-acetaminophen (PERCOCET) 5-325 MG Tab Take 1 Tab by mouth every four hours as needed for Moderate Pain. 15 Tab 0   • ibuprofen (MOTRIN) 600 MG Tab Take 1 Tab by mouth every 6 hours as needed (For cramping after delivery; do not give if patient is receiving ketorolac (Toradol)). 30 Tab 1   • docusate sodium 100 MG Cap Take 100 mg by mouth 2 times a day as needed for Constipation. 20 Cap 1   • Prenatal Vit w/Dq-Mzorssnth-LI (PNV PO) Take 1 Tab by mouth.       No current facility-administered medications on file prior to visit.        History reviewed. No pertinent family history.      No Known Allergies      Review of Systems   Constitutional: Negative for fever.   HENT: Negative for sore throat.    Gastrointestinal: Positive  "for abdominal pain. Negative for nausea, diarrhea, constipation and hematochezia.   Genitourinary: Negative for dysuria and hematuria.   Neurological: denies dizziness, confusion, disorientation.   No extremity weakness or numbness  All other systems reviewed and are negative.         Objective:     Blood pressure 120/60, pulse 68, temperature 36.9 °C (98.4 °F), resp. rate 16, height 1.575 m (5' 2\"), last menstrual period 05/22/2018, SpO2 97 %, currently breastfeeding.      Physical Exam   Constitutional: pt appears well-developed. No distress.   HENT:   Nose: No nasal discharge.   Mouth/Throat: Mucous membranes are moist. Oropharynx is clear.   Eyes: Conjunctivae and EOM are normal. Pupils are equal, round, and reactive to light. Right eye exhibits no discharge. Left eye exhibits no discharge.   Neck: Neck supple.   Cardiovascular: Normal rate, regular rhythm, S1 normal and S2 normal.    Pulmonary/Chest: Effort normal and breath sounds normal. There is normal air entry. No respiratory distress.   Abdominal: Soft. There is tenderness in the suprapubic area and there is rt flank pain. bowel sounds are present.   No liver or spleen enlargement .  No rebound and no guarding.   There is no pain over McBurney's point  Lymphadenopathy:     Pt has no  adenopathy.   Neurological: pt is alert and orientated x3 . No cranial nerve deficit.   Skin: Skin is warm and moist. No petechiae and no rash noted.   not diaphoretic. No jaundice.   Nursing note and vitals reviewed.         Lab Results   Component Value Date/Time    POCCOLOR Yellow 06/08/2018 07:26 PM    POCAPPEAR Clear 06/08/2018 07:26 PM    POCLEUKEST Trace 06/08/2018 07:26 PM    POCNITRITE Negative 06/08/2018 07:26 PM    POCUROBILIGE Negative 06/08/2018 07:26 PM    POCPROTEIN Negative 06/08/2018 07:26 PM    POCURPH 6.0 06/08/2018 07:26 PM    POCBLOOD Negative 06/08/2018 07:26 PM    POCSPGRV 1.015 06/08/2018 07:26 PM    POCKETONES Negative 06/08/2018 07:26 PM    " POCBILIRUBIN Negative 06/08/2018 07:26 PM    POCGLUCUA Negative 06/08/2018 07:26 PM           Assessment/Plan:      1. Acute pyelonephritis  UA with trace leuks - will treat empirically for pyleonephritis     Urine cx sent    - cefTRIAXone (ROCEPHIN) 1 g, lidocaine (XYLOCAINE) 1 % 3.6 mL for IM use; 1 g by Intramuscular route Once.  - ciprofloxacin (CIPRO) 500 MG Tab; Take 1 Tab by mouth 2 times a day for 7 days.  Dispense: 14 Tab; Refill: 0  - Diclofenac Sodium (VOLTAREN) 1 % Gel; Apply 4 g to skin as directed 3 times a day as needed.  Dispense: 1 Tube; Refill: 0  - URINE CULTURE(NEW); Future

## 2018-06-11 LAB
BACTERIA UR CULT: NORMAL
SIGNIFICANT IND 70042: NORMAL
SITE SITE: NORMAL
SOURCE SOURCE: NORMAL

## 2018-09-02 ENCOUNTER — OFFICE VISIT (OUTPATIENT)
Dept: URGENT CARE | Facility: CLINIC | Age: 33
End: 2018-09-02
Payer: COMMERCIAL

## 2018-09-02 VITALS
DIASTOLIC BLOOD PRESSURE: 72 MMHG | OXYGEN SATURATION: 98 % | RESPIRATION RATE: 16 BRPM | WEIGHT: 150 LBS | HEART RATE: 64 BPM | HEIGHT: 62 IN | SYSTOLIC BLOOD PRESSURE: 116 MMHG | BODY MASS INDEX: 27.6 KG/M2 | TEMPERATURE: 97.6 F

## 2018-09-02 DIAGNOSIS — N93.9 VAGINAL BLEEDING: ICD-10-CM

## 2018-09-02 LAB
INT CON NEG: NORMAL
INT CON POS: NORMAL
POC URINE PREGNANCY TEST: NORMAL

## 2018-09-02 PROCEDURE — 81025 URINE PREGNANCY TEST: CPT | Performed by: PHYSICIAN ASSISTANT

## 2018-09-02 PROCEDURE — 99214 OFFICE O/P EST MOD 30 MIN: CPT | Performed by: PHYSICIAN ASSISTANT

## 2018-09-02 ASSESSMENT — ENCOUNTER SYMPTOMS
SHORTNESS OF BREATH: 0
COUGH: 0
FLANK PAIN: 0
CHILLS: 0
ABDOMINAL PAIN: 0
HEADACHES: 0
NAUSEA: 0
BACK PAIN: 0
FEVER: 0
DIZZINESS: 0
WEAKNESS: 0
VOMITING: 0

## 2018-09-02 NOTE — LETTER
September 2, 2018         Patient: Lela Olivas   YOB: 1985   Date of Visit: 9/2/2018           To Whom it May Concern:    Lela Olivas was seen in my clinic on 9/2/2018. 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      If you have any questions or concerns, please don't hesitate to call.        Sincerely,           Nicky Sutherland P.A.-C.  Electronically Signed

## 2018-09-03 NOTE — PROGRESS NOTES
"Subjective:      Lela Olivas is a 32 y.o. female who presents with Menstrual Problem (bleeding after period with no cramping pt just feels heaviness)            Patient is here with vaginal bleeding that started today. Patient reports she is concerned because she ended her period last week and she is having menstrual bleeding again. She denies any vaginal or pelvic pain. She states her flow is medium/regular. She denies fever, chills, dysuria, frequency or urgency. She has no abnormal discharge or flank pain. She is going through a pad about every 2-3 hours. The patient states she has been under extreme amount of stress over the past several weeks with multiple traumatic events occurring in her life.      Past Medical History:   Diagnosis Date   • No known health problems        Past Surgical History:   Procedure Laterality Date   • OTHER      cyst removed       History reviewed. No pertinent family history.    No Known Allergies    Medications, Allergies, and current problem list reviewed today in Epic    Review of Systems   Constitutional: Negative for chills, fever and malaise/fatigue.   Respiratory: Negative for cough and shortness of breath.    Cardiovascular: Negative for chest pain and leg swelling.   Gastrointestinal: Negative for abdominal pain, nausea and vomiting.   Genitourinary: Negative for dysuria, flank pain, frequency, hematuria and urgency.        Vaginal bleeding    Musculoskeletal: Negative for back pain.   Skin: Negative for rash.   Neurological: Negative for dizziness, weakness and headaches.     All other systems reviewed and are negative.        Objective:     /72   Pulse 64   Temp 36.4 °C (97.6 °F)   Resp 16   Ht 1.575 m (5' 2\")   Wt 68 kg (150 lb)   SpO2 98%   BMI 27.44 kg/m²      Physical Exam   Constitutional: She is oriented to person, place, and time. She appears well-developed and well-nourished. No distress.   Cardiovascular: Normal rate, regular rhythm and normal " heart sounds.  Exam reveals no gallop and no friction rub.    Pulmonary/Chest: Effort normal and breath sounds normal. No respiratory distress. She has no wheezes. She has no rales.   Abdominal: Soft. Bowel sounds are normal. She exhibits no distension and no mass. There is no tenderness. There is no rigidity, no rebound, no guarding and no CVA tenderness.   Neurological: She is alert and oriented to person, place, and time. No cranial nerve deficit.   Skin: Skin is warm and dry. No rash noted. She is not diaphoretic.   Psychiatric: She has a normal mood and affect. Her behavior is normal. Judgment and thought content normal.               Assessment/Plan:     1. Vaginal bleeding  US-GYN-PELVIS TRANSVAGINAL    POCT Pregnancy     HCG- negative   Reassured patient. Bleeding is mild and could be due to hormone changes due to stress. Possible fibroids-- but patient is not having pain.    Advised patient to monitor symptoms.  Order for US given if symptoms persist.    - US-GYN-PELVIS TRANSVAGINAL; Future    RED flags discussed. Advised ER evaluation if heavy bleeding (more than 1 pad per hour), pain, dizziness, or weakness.    Suggested follow-up with GYN.     Differential diagnoses, Supportive care, and indications for immediate follow-up discussed with patient.   Instructed to return to clinic or nearest emergency department for any change in condition, further concerns, or worsening of symptoms.    The patient demonstrated a good understanding and agreed with the treatment plan.    Nicky Sutherland P.A.-C.

## 2018-09-25 ENCOUNTER — OFFICE VISIT (OUTPATIENT)
Dept: MEDICAL GROUP | Facility: PHYSICIAN GROUP | Age: 33
End: 2018-09-25
Payer: COMMERCIAL

## 2018-09-25 VITALS
OXYGEN SATURATION: 95 % | WEIGHT: 133 LBS | HEART RATE: 73 BPM | DIASTOLIC BLOOD PRESSURE: 70 MMHG | TEMPERATURE: 97.5 F | RESPIRATION RATE: 16 BRPM | BODY MASS INDEX: 24.48 KG/M2 | SYSTOLIC BLOOD PRESSURE: 118 MMHG | HEIGHT: 62 IN

## 2018-09-25 DIAGNOSIS — F41.9 ANXIETY: ICD-10-CM

## 2018-09-25 DIAGNOSIS — R63.4 WEIGHT LOSS: ICD-10-CM

## 2018-09-25 DIAGNOSIS — Z23 NEED FOR VACCINATION: ICD-10-CM

## 2018-09-25 DIAGNOSIS — K64.0 GRADE I HEMORRHOIDS: ICD-10-CM

## 2018-09-25 DIAGNOSIS — N92.1 MENORRHAGIA WITH IRREGULAR CYCLE: ICD-10-CM

## 2018-09-25 DIAGNOSIS — Z00.00 ROUTINE GENERAL MEDICAL EXAMINATION AT A HEALTH CARE FACILITY: ICD-10-CM

## 2018-09-25 PROCEDURE — 90471 IMMUNIZATION ADMIN: CPT | Performed by: INTERNAL MEDICINE

## 2018-09-25 PROCEDURE — 99385 PREV VISIT NEW AGE 18-39: CPT | Mod: 25 | Performed by: INTERNAL MEDICINE

## 2018-09-25 PROCEDURE — 90686 IIV4 VACC NO PRSV 0.5 ML IM: CPT | Performed by: INTERNAL MEDICINE

## 2018-09-25 ASSESSMENT — PATIENT HEALTH QUESTIONNAIRE - PHQ9: CLINICAL INTERPRETATION OF PHQ2 SCORE: 0

## 2018-09-26 NOTE — ASSESSMENT & PLAN NOTE
She reports chronic hemorrhoids. Burning sensation, itchy sensation. 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 Counseling provided on hygiene, continue H preparation prn. Consider GI referral if worse

## 2018-09-26 NOTE — ASSESSMENT & PLAN NOTE
Main reasons is mother with possible bipolar,and narcistic personality. She had tried to help her, but her mother is refusing to take medication and is making her feel bad. She had stopped communicating with her mother. She let her mother sees her children almost 1-2 per month. 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 She has two boys 1 y/o and 6 y/o. Her  is main provider at home financially. She denies suicidal thoughts. She feels better. She had lost some weight, because of stress. She has never been on medication. She denies suicidal thoughts.

## 2018-09-26 NOTE — PROGRESS NOTES
New Patient to Establish    Reason to establish: Anxiety weight loss, hemorrhoids lab work reviewed    Lela Olivas is a 32 y.o. female here today for evaluation and management of:    Anxiety  Main reasons is mother with possible bipolar,and narcistic personality. She had tried to help her, but her mother is refusing to take medication and is making her feel bad. She had stopped communicating with her mother. She let her mother sees her children almost 1-2 per month. 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 She has two boys 3 y/o and 4 y/o. Her  is main provider at home financially. She denies suicidal thoughts. She feels better. She had lost some weight, because of stress. She has never been on medication. She denies suicidal thoughts.     Weight loss  Due to stress. Stable now., I did advise healthy weight loss, exercise.     Menorrhagia with irregular cycle  She reports that last couple of months because of stress she did have menstrual cycle back to back. She has OB-GYN from pervious pregnancy and she would like to follow up with them     Grade I hemorrhoids  She reports chronic hemorrhoids. Burning sensation, itchy sensation. 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 Counseling provided on hygiene, continue H preparation prn. Consider GI referral if worse       Past Medical History:   Diagnosis Date   • Anxiety    • Insomnia    • No known health problems        No current outpatient prescriptions on file.     No current facility-administered medications for this visit.        Allergies as of 09/25/2018   • (No Known Allergies)       Social History     Social History   • Marital status:      Spouse name: N/A   • Number of children: N/A   • Years of education: N/A     Occupational History   • Not on file.     Social History Main Topics   • Smoking status: Never Smoker   • Smokeless tobacco: Never Used   • Alcohol use No   • Drug use: No   • Sexual activity: Yes     Partners: Male     Other Topics Concern   • Not  "on file     Social History Narrative   • No narrative on file       Family History   Problem Relation Age of Onset   • Psychiatry Mother         depression   • Diabetes Father    • Cancer Maternal Uncle         lung cancer, smoker   • Cancer Paternal Grandfather         brain cancer   • Heart Disease Maternal Uncle         MI   • Stroke Neg Hx    • Alcohol/Drug Neg Hx        Past Surgical History:   Procedure Laterality Date   • OTHER      cyst removed   • OTHER      not sure, soft tissue or skin lesion        ROS: All systems reviewed are negative except for HPI    /70 (BP Location: Left arm, Patient Position: Sitting, BP Cuff Size: Adult)   Pulse 73   Temp 36.4 °C (97.5 °F) (Temporal)   Resp 16   Ht 1.575 m (5' 2\")   Wt 60.3 kg (133 lb)   LMP 09/25/2018 (Exact Date)   SpO2 95%   Breastfeeding? No   BMI 24.33 kg/m²     Physical Exam  General:  Alert and oriented, No apparent distress.  Eyes: Pupils equal and reactive. No scleral icterus. EOMI  Throat: Clear no erythema or exudates noted. Oral mucosa moist, oral dental intact  Neck: Supple. No cervical or supraclavicular lymphadenopathy noted. Thyroid not enlarged.  Lungs: normal effort,  Clear to auscultation  Cardiovascular: Regular rate and rhythm. No murmurs, rubs or gallops, pulses intact   Abdomen:  Soft, +BS, no tenderness. No rebound or guarding noted. No hepato or splenomegaly   Extremities: No clubbing, cyanosis, edema.  Neuro: cranial nerves intact, sensation intact   Muscle skeletal: no back tenderness   Skin: Clear. No rash or suspicious skin lesions noted.    Assessment and Plan    1. Routine general medical examination at a health care facility  Routine lab. Work. She would like to follow up with GYN for pap smear. She is up to date with rest of immunization     2. Weight loss  Due to stress. Stable. Continue to monitor     3. Menorrhagia with irregular cycle  Follow up with GYN     4. Need for vaccination  - Influenza Vaccine Quad " Injection >3Y (PF)    5. Grade I hemorrhoids  H preparation prn. Proper hydration, high fiber diet.     6. Anxiety  Counseling provided. Doing well. She refuses to see counseling.       Followup: Return if symptoms worsen or fail to improve, for Short, annual exam.      Signed by: Jason Rice M.D.

## 2018-09-26 NOTE — ASSESSMENT & PLAN NOTE
She reports that last couple of months because of stress she did have menstrual cycle back to back. She has OB-GYN from pervious pregnancy and she would like to follow up with them

## 2019-01-23 LAB
ABO GROUP BLD: NORMAL
HBV SURFACE AG SERPL QL IA: NORMAL
RUBV IGG SERPL IA-ACNC: NORMAL
TREPONEMA PALLIDUM IGG+IGM AB [PRESENCE] IN SERUM OR PLASMA BY IMMUNOASSAY: NORMAL

## 2019-07-17 ENCOUNTER — HOSPITAL ENCOUNTER (INPATIENT)
Facility: MEDICAL CENTER | Age: 34
LOS: 1 days | End: 2019-07-18
Attending: OBSTETRICS & GYNECOLOGY | Admitting: OBSTETRICS & GYNECOLOGY
Payer: COMMERCIAL

## 2019-07-17 ENCOUNTER — ANESTHESIA EVENT (OUTPATIENT)
Dept: OBGYN | Facility: MEDICAL CENTER | Age: 34
End: 2019-07-17
Payer: COMMERCIAL

## 2019-07-17 ENCOUNTER — ANESTHESIA (OUTPATIENT)
Dept: OBGYN | Facility: MEDICAL CENTER | Age: 34
End: 2019-07-17
Payer: COMMERCIAL

## 2019-07-17 DIAGNOSIS — R52 PAIN RELATED TO VAGINAL DELIVERY: ICD-10-CM

## 2019-07-17 LAB
BASOPHILS # BLD AUTO: 0.3 % (ref 0–1.8)
BASOPHILS # BLD: 0.03 K/UL (ref 0–0.12)
EOSINOPHIL # BLD AUTO: 0.13 K/UL (ref 0–0.51)
EOSINOPHIL NFR BLD: 1.1 % (ref 0–6.9)
ERYTHROCYTE [DISTWIDTH] IN BLOOD BY AUTOMATED COUNT: 39.1 FL (ref 35.9–50)
ERYTHROCYTE [DISTWIDTH] IN BLOOD BY AUTOMATED COUNT: 39.6 FL (ref 35.9–50)
HCT VFR BLD AUTO: 31.6 % (ref 37–47)
HCT VFR BLD AUTO: 33.1 % (ref 37–47)
HGB BLD-MCNC: 10 G/DL (ref 12–16)
HGB BLD-MCNC: 10.5 G/DL (ref 12–16)
HOLDING TUBE BB 8507: NORMAL
IMM GRANULOCYTES # BLD AUTO: 0.05 K/UL (ref 0–0.11)
IMM GRANULOCYTES NFR BLD AUTO: 0.4 % (ref 0–0.9)
LYMPHOCYTES # BLD AUTO: 3.08 K/UL (ref 1–4.8)
LYMPHOCYTES NFR BLD: 27.2 % (ref 22–41)
MCH RBC QN AUTO: 25 PG (ref 27–33)
MCH RBC QN AUTO: 25.3 PG (ref 27–33)
MCHC RBC AUTO-ENTMCNC: 31.6 G/DL (ref 33.6–35)
MCHC RBC AUTO-ENTMCNC: 31.7 G/DL (ref 33.6–35)
MCV RBC AUTO: 78.8 FL (ref 81.4–97.8)
MCV RBC AUTO: 79.8 FL (ref 81.4–97.8)
MONOCYTES # BLD AUTO: 0.59 K/UL (ref 0–0.85)
MONOCYTES NFR BLD AUTO: 5.2 % (ref 0–13.4)
NEUTROPHILS # BLD AUTO: 7.43 K/UL (ref 2–7.15)
NEUTROPHILS NFR BLD: 65.8 % (ref 44–72)
NRBC # BLD AUTO: 0 K/UL
NRBC BLD-RTO: 0 /100 WBC
PLATELET # BLD AUTO: 198 K/UL (ref 164–446)
PLATELET # BLD AUTO: 243 K/UL (ref 164–446)
PMV BLD AUTO: 10.3 FL (ref 9–12.9)
PMV BLD AUTO: 10.4 FL (ref 9–12.9)
RBC # BLD AUTO: 3.96 M/UL (ref 4.2–5.4)
RBC # BLD AUTO: 4.2 M/UL (ref 4.2–5.4)
WBC # BLD AUTO: 11.3 K/UL (ref 4.8–10.8)
WBC # BLD AUTO: 12.6 K/UL (ref 4.8–10.8)

## 2019-07-17 PROCEDURE — A9270 NON-COVERED ITEM OR SERVICE: HCPCS | Performed by: OBSTETRICS & GYNECOLOGY

## 2019-07-17 PROCEDURE — 36415 COLL VENOUS BLD VENIPUNCTURE: CPT

## 2019-07-17 PROCEDURE — 700111 HCHG RX REV CODE 636 W/ 250 OVERRIDE (IP): Performed by: OBSTETRICS & GYNECOLOGY

## 2019-07-17 PROCEDURE — 700105 HCHG RX REV CODE 258

## 2019-07-17 PROCEDURE — 303615 HCHG EPIDURAL/SPINAL ANESTHESIA FOR LABOR

## 2019-07-17 PROCEDURE — 304965 HCHG RECOVERY SERVICES

## 2019-07-17 PROCEDURE — 0HQ9XZZ REPAIR PERINEUM SKIN, EXTERNAL APPROACH: ICD-10-PCS | Performed by: OBSTETRICS & GYNECOLOGY

## 2019-07-17 PROCEDURE — 700102 HCHG RX REV CODE 250 W/ 637 OVERRIDE(OP): Performed by: OBSTETRICS & GYNECOLOGY

## 2019-07-17 PROCEDURE — 85025 COMPLETE CBC W/AUTO DIFF WBC: CPT

## 2019-07-17 PROCEDURE — 770002 HCHG ROOM/CARE - OB PRIVATE (112)

## 2019-07-17 PROCEDURE — 700111 HCHG RX REV CODE 636 W/ 250 OVERRIDE (IP)

## 2019-07-17 PROCEDURE — 59409 OBSTETRICAL CARE: CPT

## 2019-07-17 PROCEDURE — 700112 HCHG RX REV CODE 229: Performed by: OBSTETRICS & GYNECOLOGY

## 2019-07-17 PROCEDURE — 10907ZC DRAINAGE OF AMNIOTIC FLUID, THERAPEUTIC FROM PRODUCTS OF CONCEPTION, VIA NATURAL OR ARTIFICIAL OPENING: ICD-10-PCS | Performed by: OBSTETRICS & GYNECOLOGY

## 2019-07-17 PROCEDURE — 85027 COMPLETE CBC AUTOMATED: CPT

## 2019-07-17 RX ORDER — CARBOPROST TROMETHAMINE 250 UG/ML
250 INJECTION, SOLUTION INTRAMUSCULAR
Status: DISCONTINUED | OUTPATIENT
Start: 2019-07-17 | End: 2019-07-18 | Stop reason: HOSPADM

## 2019-07-17 RX ORDER — SODIUM CHLORIDE, SODIUM LACTATE, POTASSIUM CHLORIDE, CALCIUM CHLORIDE 600; 310; 30; 20 MG/100ML; MG/100ML; MG/100ML; MG/100ML
INJECTION, SOLUTION INTRAVENOUS PRN
Status: DISCONTINUED | OUTPATIENT
Start: 2019-07-17 | End: 2019-07-18 | Stop reason: HOSPADM

## 2019-07-17 RX ORDER — ACETAMINOPHEN 325 MG/1
325 TABLET ORAL EVERY 4 HOURS PRN
Status: DISCONTINUED | OUTPATIENT
Start: 2019-07-17 | End: 2019-07-18 | Stop reason: HOSPADM

## 2019-07-17 RX ORDER — DOCUSATE SODIUM 100 MG/1
100 CAPSULE, LIQUID FILLED ORAL 2 TIMES DAILY PRN
Status: DISCONTINUED | OUTPATIENT
Start: 2019-07-17 | End: 2019-07-18 | Stop reason: HOSPADM

## 2019-07-17 RX ORDER — MISOPROSTOL 200 UG/1
800 TABLET ORAL
Status: DISCONTINUED | OUTPATIENT
Start: 2019-07-17 | End: 2019-07-18 | Stop reason: HOSPADM

## 2019-07-17 RX ORDER — OXYCODONE AND ACETAMINOPHEN 10; 325 MG/1; MG/1
1 TABLET ORAL EVERY 4 HOURS PRN
Status: DISCONTINUED | OUTPATIENT
Start: 2019-07-17 | End: 2019-07-18 | Stop reason: HOSPADM

## 2019-07-17 RX ORDER — ROPIVACAINE HYDROCHLORIDE 2 MG/ML
INJECTION, SOLUTION EPIDURAL; INFILTRATION; PERINEURAL
Status: COMPLETED
Start: 2019-07-17 | End: 2019-07-17

## 2019-07-17 RX ORDER — OXYCODONE HYDROCHLORIDE AND ACETAMINOPHEN 5; 325 MG/1; MG/1
1 TABLET ORAL EVERY 4 HOURS PRN
Status: DISCONTINUED | OUTPATIENT
Start: 2019-07-17 | End: 2019-07-17

## 2019-07-17 RX ORDER — SODIUM CHLORIDE, SODIUM LACTATE, POTASSIUM CHLORIDE, CALCIUM CHLORIDE 600; 310; 30; 20 MG/100ML; MG/100ML; MG/100ML; MG/100ML
INJECTION, SOLUTION INTRAVENOUS
Status: ACTIVE
Start: 2019-07-17 | End: 2019-07-17

## 2019-07-17 RX ORDER — VITAMIN A ACETATE, BETA CAROTENE, ASCORBIC ACID, CHOLECALCIFEROL, .ALPHA.-TOCOPHEROL ACETATE, DL-, THIAMINE MONONITRATE, RIBOFLAVIN, NIACINAMIDE, PYRIDOXINE HYDROCHLORIDE, FOLIC ACID, CYANOCOBALAMIN, CALCIUM CARBONATE, FERROUS FUMARATE, ZINC OXIDE, CUPRIC OXIDE 3080; 12; 120; 400; 1; 1.84; 3; 20; 22; 920; 25; 200; 27; 10; 2 [IU]/1; UG/1; MG/1; [IU]/1; MG/1; MG/1; MG/1; MG/1; MG/1; [IU]/1; MG/1; MG/1; MG/1; MG/1; MG/1
1 TABLET, FILM COATED ORAL EVERY MORNING
Status: DISCONTINUED | OUTPATIENT
Start: 2019-07-17 | End: 2019-07-17

## 2019-07-17 RX ORDER — ALUMINA, MAGNESIA, AND SIMETHICONE 2400; 2400; 240 MG/30ML; MG/30ML; MG/30ML
30 SUSPENSION ORAL EVERY 6 HOURS PRN
Status: DISCONTINUED | OUTPATIENT
Start: 2019-07-17 | End: 2019-07-18 | Stop reason: HOSPADM

## 2019-07-17 RX ORDER — DEXTROSE, SODIUM CHLORIDE, SODIUM LACTATE, POTASSIUM CHLORIDE, AND CALCIUM CHLORIDE 5; .6; .31; .03; .02 G/100ML; G/100ML; G/100ML; G/100ML; G/100ML
INJECTION, SOLUTION INTRAVENOUS CONTINUOUS
Status: DISCONTINUED | OUTPATIENT
Start: 2019-07-17 | End: 2019-07-17 | Stop reason: ALTCHOICE

## 2019-07-17 RX ORDER — ONDANSETRON 2 MG/ML
4 INJECTION INTRAMUSCULAR; INTRAVENOUS EVERY 6 HOURS PRN
Status: DISCONTINUED | OUTPATIENT
Start: 2019-07-17 | End: 2019-07-18 | Stop reason: HOSPADM

## 2019-07-17 RX ORDER — SODIUM CHLORIDE, SODIUM LACTATE, POTASSIUM CHLORIDE, CALCIUM CHLORIDE 600; 310; 30; 20 MG/100ML; MG/100ML; MG/100ML; MG/100ML
INJECTION, SOLUTION INTRAVENOUS
Status: COMPLETED
Start: 2019-07-17 | End: 2019-07-17

## 2019-07-17 RX ORDER — IBUPROFEN 600 MG/1
600 TABLET ORAL EVERY 6 HOURS PRN
Status: DISCONTINUED | OUTPATIENT
Start: 2019-07-17 | End: 2019-07-18 | Stop reason: HOSPADM

## 2019-07-17 RX ORDER — OXYCODONE HYDROCHLORIDE AND ACETAMINOPHEN 5; 325 MG/1; MG/1
1 TABLET ORAL EVERY 4 HOURS PRN
Status: DISCONTINUED | OUTPATIENT
Start: 2019-07-17 | End: 2019-07-18 | Stop reason: HOSPADM

## 2019-07-17 RX ORDER — METHYLERGONOVINE MALEATE 0.2 MG/ML
0.2 INJECTION INTRAVENOUS
Status: DISCONTINUED | OUTPATIENT
Start: 2019-07-17 | End: 2019-07-18 | Stop reason: HOSPADM

## 2019-07-17 RX ORDER — MISOPROSTOL 200 UG/1
600 TABLET ORAL
Status: DISCONTINUED | OUTPATIENT
Start: 2019-07-17 | End: 2019-07-18 | Stop reason: HOSPADM

## 2019-07-17 RX ORDER — IBUPROFEN 600 MG/1
600 TABLET ORAL EVERY 6 HOURS PRN
Status: DISCONTINUED | OUTPATIENT
Start: 2019-07-17 | End: 2019-07-17

## 2019-07-17 RX ORDER — ONDANSETRON 4 MG/1
4 TABLET, ORALLY DISINTEGRATING ORAL EVERY 6 HOURS PRN
Status: DISCONTINUED | OUTPATIENT
Start: 2019-07-17 | End: 2019-07-18 | Stop reason: HOSPADM

## 2019-07-17 RX ORDER — VITAMIN A ACETATE, BETA CAROTENE, ASCORBIC ACID, CHOLECALCIFEROL, .ALPHA.-TOCOPHEROL ACETATE, DL-, THIAMINE MONONITRATE, RIBOFLAVIN, NIACINAMIDE, PYRIDOXINE HYDROCHLORIDE, FOLIC ACID, CYANOCOBALAMIN, CALCIUM CARBONATE, FERROUS FUMARATE, ZINC OXIDE, CUPRIC OXIDE 3080; 12; 120; 400; 1; 1.84; 3; 20; 22; 920; 25; 200; 27; 10; 2 [IU]/1; UG/1; MG/1; [IU]/1; MG/1; MG/1; MG/1; MG/1; MG/1; [IU]/1; MG/1; MG/1; MG/1; MG/1; MG/1
1 TABLET, FILM COATED ORAL EVERY MORNING
Status: DISCONTINUED | OUTPATIENT
Start: 2019-07-18 | End: 2019-07-18 | Stop reason: HOSPADM

## 2019-07-17 RX ORDER — BISACODYL 10 MG
10 SUPPOSITORY, RECTAL RECTAL PRN
Status: DISCONTINUED | OUTPATIENT
Start: 2019-07-17 | End: 2019-07-18 | Stop reason: HOSPADM

## 2019-07-17 RX ORDER — SODIUM CHLORIDE, SODIUM LACTATE, POTASSIUM CHLORIDE, CALCIUM CHLORIDE 600; 310; 30; 20 MG/100ML; MG/100ML; MG/100ML; MG/100ML
INJECTION, SOLUTION INTRAVENOUS CONTINUOUS
Status: DISCONTINUED | OUTPATIENT
Start: 2019-07-17 | End: 2019-07-17 | Stop reason: ALTCHOICE

## 2019-07-17 RX ADMIN — ROPIVACAINE HYDROCHLORIDE 200 MG: 2 INJECTION, SOLUTION EPIDURAL; INFILTRATION at 07:35

## 2019-07-17 RX ADMIN — Medication 2000 ML/HR: at 11:43

## 2019-07-17 RX ADMIN — OXYCODONE HYDROCHLORIDE AND ACETAMINOPHEN 1 TABLET: 5; 325 TABLET ORAL at 18:38

## 2019-07-17 RX ADMIN — OXYCODONE HYDROCHLORIDE AND ACETAMINOPHEN 1 TABLET: 5; 325 TABLET ORAL at 12:53

## 2019-07-17 RX ADMIN — IBUPROFEN 600 MG: 600 TABLET ORAL at 19:07

## 2019-07-17 RX ADMIN — ALUMINUM HYDROXIDE, MAGNESIUM HYDROXIDE,SIMETHICONE 30 ML: 400; 400; 40 LIQUID ORAL at 10:35

## 2019-07-17 RX ADMIN — OXYCODONE HYDROCHLORIDE AND ACETAMINOPHEN 1 TABLET: 5; 325 TABLET ORAL at 22:35

## 2019-07-17 RX ADMIN — IBUPROFEN 600 MG: 600 TABLET ORAL at 12:53

## 2019-07-17 RX ADMIN — DOCUSATE SODIUM 100 MG: 100 CAPSULE, LIQUID FILLED ORAL at 18:38

## 2019-07-17 RX ADMIN — Medication 125 ML/HR: at 12:55

## 2019-07-17 RX ADMIN — SODIUM CHLORIDE, POTASSIUM CHLORIDE, SODIUM LACTATE AND CALCIUM CHLORIDE 1000 ML: 600; 310; 30; 20 INJECTION, SOLUTION INTRAVENOUS at 06:45

## 2019-07-17 ASSESSMENT — COPD QUESTIONNAIRES
HAVE YOU SMOKED AT LEAST 100 CIGARETTES IN YOUR ENTIRE LIFE: NO/DON'T KNOW
DURING THE PAST 4 WEEKS HOW MUCH DID YOU FEEL SHORT OF BREATH: NONE/LITTLE OF THE TIME
DO YOU EVER COUGH UP ANY MUCUS OR PHLEGM?: NO/ONLY WITH OCCASIONAL COLDS OR INFECTIONS
IN THE PAST 12 MONTHS DO YOU DO LESS THAN YOU USED TO BECAUSE OF YOUR BREATHING PROBLEMS: DISAGREE/UNSURE
COPD SCREENING SCORE: 0

## 2019-07-17 ASSESSMENT — LIFESTYLE VARIABLES
EVER_SMOKED: NEVER
ALCOHOL_USE: NO

## 2019-07-17 ASSESSMENT — PATIENT HEALTH QUESTIONNAIRE - PHQ9
1. LITTLE INTEREST OR PLEASURE IN DOING THINGS: NOT AT ALL
SUM OF ALL RESPONSES TO PHQ9 QUESTIONS 1 AND 2: 0
2. FEELING DOWN, DEPRESSED, IRRITABLE, OR HOPELESS: NOT AT ALL

## 2019-07-17 ASSESSMENT — PAIN SCALES - GENERAL: PAIN_LEVEL: 0

## 2019-07-17 NOTE — H&P
"Labor and Delivery History and Physical    CC: contractions    HPI: Lela Olivas is a 32 yo  at 39w0d who presented in labor. Reports contractions, denies bleeding or LOF. Positive FM. She received her prenatal care with  Working this pregnancy. It was largely uncomplicated. She had an anatomic scan at 19w1d that demonstrated bilateral RPD. On follow up scan it had resolved. She declined genetic or carrier screening.    All other systems were reviewed and were negative.    PMH:GERD  PSH:none  OB HX:2 term vaginal deliveries of viable male infants in  and   Gyn Hx: denies STI including HSV for herself or her spouse, denies abnormal pap smears  SH:denies T/E/D  Meds:PNV  Allergies:NKDA    /58   Pulse 77   Temp 36.9 °C (98.4 °F) (Temporal)   Ht 1.575 m (5' 2\")   Wt 59.4 kg (131 lb)   LMP 2018 (Exact Date)   BMI 23.96 kg/m²     Physical Exam  Gen: Pain with contractions  Abd: gravid, non tender  Ext: no edema    FHT:130/moderate variability  [place]:irregular contractions visible  SVE:4/C/BBOW    Laboratory Evaluation  ABO: A  pos  GBS: neg  GTT:84  Rubella: Immune  HIV: Neg  RPR: NR  Hep sAg: neg    Assessment:   1. Term IUP at 39w0d  2. Labor    Plan: Admit to L&D for management of labor. May have epidural on demand. Anticipate .     Aaron Zuluaga M.D.      "

## 2019-07-17 NOTE — PROGRESS NOTES
Pt presented to L&D for c/o UC's, denies LOF, vaginal bleeding, states + fetal movement. Pt tearful. UC's palpating moderate-strong. EFM and TOCO applied. SVE /-1. Pt is a  with an EDC of 19 making her 39 0/. Dr. Bobby notified. Orders to admit pt received. IV started, pt prepped for epidural. Report to RD Landry RN

## 2019-07-17 NOTE — ANESTHESIA TIME REPORT
Anesthesia Start and Stop Event Times     Date Time Event    7/17/2019 0725 Anesthesia Start     1137 Anesthesia Stop        Responsible Staff  07/17/19    Name Role Begin End    Enrico Arroyo M.D. Anesth 0725 1000    Heri Castillo M.D. Anesth 1000 1137        Preop Diagnosis (Free Text):  Pre-op Diagnosis             Preop Diagnosis (Codes):    Post op Diagnosis  Intrauterine pregnancy      Premium Reason  A. 3PM - 7AM    Comments: Arroyo placed at 7:25 post OB

## 2019-07-17 NOTE — PROGRESS NOTES
"1430: Pt arrived from L&D via w/c with infant and FOB,  phone report called by SOFIA Sanders. FOB \"MD\" at bedside. Armbands verified with second RN, transponder number verified with lights flashing. Pt assessed, fundus firm, lochia light, VSS. Second bag of Pitocin infusing at 125 mL/hr. Pt was able to void without difficulty. Oriented to room, call light, emergency light, Skylight. Educated pt on feeding infant Q 2-3 hours, recording I&Os, and safe sleeping. POC discussed, questions answered, call light within reach.     RN assisted pt to latch infant to right breast, infant sleepy, placed skin to skin. MOB will call prior to next feeding attempt for d-stick.   "

## 2019-07-17 NOTE — CARE PLAN
Problem: Altered physiologic condition related to immediate post-delivery state and potential for bleeding/hemorrhage  Goal: Patient physiologically stable as evidenced by normal lochia, palpable uterine involution and vital signs within normal limits  Outcome: PROGRESSING AS EXPECTED  Fundus firm, lochia light, vitals stable. Pitocin infusing at 125 mL/hr.    Problem: Alteration in comfort related to episiotomy, vaginal repair and/or after birth pains  Goal: Patient is able to ambulate, care for self and infant  Outcome: PROGRESSING AS EXPECTED  Pt states pain is being adequately controlled, able to ambulate and care for self and infant. Encouraged pt to call for PRN pain medications as needed. Pain assessed q2-4 hours.

## 2019-07-17 NOTE — PROGRESS NOTES
0700 Received report from NOC RN, assumed care, POC discussed, all questions answered. Pt brething through contractions, bolus for epidural running, anesthesia aware, denies needs at this time, VSS, RN at bedside continually, admission profile completed.  0730 Dr Arroyo at bedside to place epidural, time out completed, pt consented, all questions answered, pt verbalized understanding, consents signed.   0735 epidural placed, negative test dose appreciated, pt tolerated well.  0800 lai placed, pt tolerated well. Pt resting comfortable in bed, denies needs, encouraged to call RN for any  Needs, wants, desires or concerns.   0839 Dr Zuluaga at bedside, SVE as noted, AROM-clear fluid noted, pt tolerated well. Will continue with POC.  1030 RN at bedside, SVE as noted, pt complete.  1048 Dr. Zuluaga in department, updated on pt status, Rn to start pushing with pt. Will continue with POC.  1137 viable baby boy born via Dr. Zuluaga. APGARs 8/9.  1143 placenta delivered spontaneously, intact. Mother and infant bonding skin-to-skin, both in stable condition.  1430 Pt ambulated to restroom to void, tolerated well, hamida care provided. Pt transferred to PPU with baby in arms, both in stable condition with CNA, report called by RN to PPU RN, assumed care, POC discussed.all questions answered. VSS. Cuddles and bands verified with both RN's at bedside.

## 2019-07-17 NOTE — PROGRESS NOTES
OB Progress Note    Comfortable now with epidural. FHT Cat I. Darcy q4min. SVE 6/C/0. AROM clear. Continue to monitor. Anticipate .     Aaron Zuluaga M.D.

## 2019-07-17 NOTE — ANESTHESIA POSTPROCEDURE EVALUATION
Patient: Lela Olivas    Procedure Summary     Date:  07/17/19 Room / Location:      Anesthesia Start:  0725 Anesthesia Stop:  1137    Procedure:  Labor Epidural Diagnosis:      Scheduled Providers:   Responsible Provider:  Heri Castillo M.D.    Anesthesia Type:  epidural ASA Status:  2          Final Anesthesia Type: epidural  Last vitals  BP   Blood Pressure: 101/61    Temp   36 °C (96.8 °F)    Pulse   Pulse: 72   Resp   16    SpO2   100 %      Anesthesia Post Evaluation    Patient location during evaluation: PACU  Patient participation: complete - patient participated  Level of consciousness: awake and alert  Pain score: 0    Airway patency: patent  Anesthetic complications: no  Cardiovascular status: hemodynamically stable  Respiratory status: acceptable  Hydration status: euvolemic    PONV: none

## 2019-07-17 NOTE — ANESTHESIA PROCEDURE NOTES
Epidural Block  Performed by: SALMA SHAH  Authorized by: SALMA SHAH     Patient Location:  OB  Start Time:  7/17/2019 7:25 AM  End Time:  7/17/2019 7:35 AM  Reason for Block: labor analgesia    patient identified, IV checked, site marked, risks and benefits discussed, surgical consent, monitors and equipment checked, pre-op evaluation and timeout performed    Patient Position:  Sitting  Prep: ChloraPrep, patient draped and sterile technique    Monitoring:  Blood pressure, continuous pulse oximetry and heart rate  Approach:  Midline  Location:  T11-T12  Injection Technique:  YARITZA saline  Skin infiltration:  Lidocaine  Strength:  1%  Dose:  3ml  Needle Type:  Tuohy  Needle Gauge:  17 G  Needle Length:  3.5 in  Loss of resistance::  6  Catheter Size:  19 G  Catheter at Skin Depth:  11  Test Dose:  Lidocaine 1.5% with epinephrine 1-to-200,000

## 2019-07-18 VITALS
TEMPERATURE: 98.1 F | OXYGEN SATURATION: 99 % | DIASTOLIC BLOOD PRESSURE: 65 MMHG | HEART RATE: 76 BPM | WEIGHT: 131 LBS | BODY MASS INDEX: 24.11 KG/M2 | RESPIRATION RATE: 18 BRPM | SYSTOLIC BLOOD PRESSURE: 106 MMHG | HEIGHT: 62 IN

## 2019-07-18 PROBLEM — R52 PAIN RELATED TO VAGINAL DELIVERY: Status: ACTIVE | Noted: 2019-07-18

## 2019-07-18 PROCEDURE — A9270 NON-COVERED ITEM OR SERVICE: HCPCS | Performed by: OBSTETRICS & GYNECOLOGY

## 2019-07-18 PROCEDURE — 700102 HCHG RX REV CODE 250 W/ 637 OVERRIDE(OP): Performed by: OBSTETRICS & GYNECOLOGY

## 2019-07-18 PROCEDURE — 700112 HCHG RX REV CODE 229: Performed by: OBSTETRICS & GYNECOLOGY

## 2019-07-18 RX ORDER — IBUPROFEN 600 MG/1
600 TABLET ORAL EVERY 6 HOURS PRN
Qty: 30 TAB | Refills: 0 | Status: SHIPPED | OUTPATIENT
Start: 2019-07-18 | End: 2020-01-03

## 2019-07-18 RX ORDER — OXYCODONE HYDROCHLORIDE AND ACETAMINOPHEN 5; 325 MG/1; MG/1
1 TABLET ORAL EVERY 6 HOURS PRN
Qty: 12 TAB | Refills: 0 | Status: SHIPPED | OUTPATIENT
Start: 2019-07-18 | End: 2019-07-21

## 2019-07-18 RX ADMIN — OXYCODONE HYDROCHLORIDE AND ACETAMINOPHEN 1 TABLET: 5; 325 TABLET ORAL at 19:37

## 2019-07-18 RX ADMIN — IBUPROFEN 600 MG: 600 TABLET ORAL at 05:47

## 2019-07-18 RX ADMIN — VITAMIN A, VITAMIN C, VITAMIN D-3, VITAMIN E, VITAMIN B-1, VITAMIN B-2, NIACIN, VITAMIN B-6, CALCIUM, IRON, ZINC, COPPER 1 TABLET: 4000; 120; 400; 22; 1.84; 3; 20; 10; 1; 12; 200; 27; 25; 2 TABLET ORAL at 05:47

## 2019-07-18 RX ADMIN — IBUPROFEN 600 MG: 600 TABLET ORAL at 18:06

## 2019-07-18 RX ADMIN — OXYCODONE HYDROCHLORIDE AND ACETAMINOPHEN 1 TABLET: 5; 325 TABLET ORAL at 08:20

## 2019-07-18 RX ADMIN — IBUPROFEN 600 MG: 600 TABLET ORAL at 11:36

## 2019-07-18 RX ADMIN — DOCUSATE SODIUM 100 MG: 100 CAPSULE, LIQUID FILLED ORAL at 08:20

## 2019-07-18 ASSESSMENT — EDINBURGH POSTNATAL DEPRESSION SCALE (EPDS)
THINGS HAVE BEEN GETTING ON TOP OF ME: NO, MOST OF THE TIME I HAVE COPED QUITE WELL
I HAVE BEEN SO UNHAPPY THAT I HAVE BEEN CRYING: NO, NEVER
I HAVE FELT SAD OR MISERABLE: NO, NOT AT ALL
THE THOUGHT OF HARMING MYSELF HAS OCCURRED TO ME: NEVER
I HAVE BEEN ANXIOUS OR WORRIED FOR NO GOOD REASON: NO, NOT AT ALL
I HAVE BEEN SO UNHAPPY THAT I HAVE HAD DIFFICULTY SLEEPING: NOT AT ALL
I HAVE LOOKED FORWARD WITH ENJOYMENT TO THINGS: AS MUCH AS I EVER DID
I HAVE FELT SCARED OR PANICKY FOR NO GOOD REASON: NO, NOT AT ALL
I HAVE BEEN ABLE TO LAUGH AND SEE THE FUNNY SIDE OF THINGS: AS MUCH AS I ALWAYS COULD
I HAVE BLAMED MYSELF UNNECESSARILY WHEN THINGS WENT WRONG: NOT VERY OFTEN

## 2019-07-18 ASSESSMENT — PATIENT HEALTH QUESTIONNAIRE - PHQ9
SUM OF ALL RESPONSES TO PHQ9 QUESTIONS 1 AND 2: 0
2. FEELING DOWN, DEPRESSED, IRRITABLE, OR HOPELESS: NOT AT ALL
1. LITTLE INTEREST OR PLEASURE IN DOING THINGS: NOT AT ALL

## 2019-07-18 NOTE — PROGRESS NOTES
Pt assessed, discussed POC, answered questions, verbalized understanding. Assisted latching infant. No further needs at this time.

## 2019-07-18 NOTE — CARE PLAN
Problem: Alteration in comfort related to episiotomy, vaginal repair and/or after birth pains  Goal: Patient is able to ambulate, care for self and infant  Outcome: PROGRESSING AS EXPECTED  Pt states pain is being adequately controlled, able to ambulate and care for self and infant. Encouraged pt to call for PRN pain medications as needed. Pain assessed q2-4 hours.     Problem: Potential knowledge deficit related to lack of understanding of self and  care  Goal: Patient will verbalize understanding of self and infant care  Outcome: PROGRESSING AS EXPECTED  Pt bonding well with infant. Recognizing cues and responding appropriately.

## 2019-07-18 NOTE — PROGRESS NOTES
0700: Bedside report received, assumed care of pt.     0815: Assessment complete, VSS, fundus firm, lochia light. Pt voiding without difficulty. Bonding well with infant. Nipples intact. Pt states pain is being well controlled and will call for PRN pain meds as needed. POC discussed with pt and family, questions answered, call light within reach.

## 2019-07-18 NOTE — CARE PLAN
Problem: Altered physiologic condition related to immediate post-delivery state and potential for bleeding/hemorrhage  Goal: Patient physiologically stable as evidenced by normal lochia, palpable uterine involution and vital signs within normal limits  Outcome: PROGRESSING AS EXPECTED  Pt stable, normal lochia, firm fundus. Will continue to monitor.     Problem: Potential for postpartum infection related to presence of episiotomy/vaginal tear and/or uterine contamination  Goal: Patient will be absent from signs and symptoms of infection  Outcome: PROGRESSING AS EXPECTED  Pt shows no s/s of infection. Will continue to monitor.

## 2019-07-18 NOTE — DISCHARGE SUMMARY
"Discharge Summary    Admission Date: 19    Discharge Date: 19    Diagnosis:Active Problems:     (spontaneous vaginal delivery)    Subjective: Pain controlled. Normal lochia. Eating, voiding and ambulating without difficulty. Breast feeding.     /59   Pulse 71   Temp 36.9 °C (98.4 °F) (Temporal)   Resp 17   Ht 1.575 m (5' 2\")   Wt 59.4 kg (131 lb)   LMP 2018 (Exact Date)   SpO2 98%   Breastfeeding? Yes   BMI 23.96 kg/m²     GEN: NAD  GI:soft, NT, ND  :fundus firm and below umbilicus  EXT:no edema    Hospital Course:Lela Olivas is a 34 yo  who presented at 39w0d in labor. She is s/p  of a viable male infant \"Nayeem\" with APGARS 8/9. EBL 300cc. First degree laceration repaired. EBL 300cc. She was meeting all post partum goals and requesting discharge home on PPD#1.     Discharge Instructions   1. Diet : general  2. Activity: Pelvic rest for 6 weeks     Lela Olivas   Home Medication Instructions KAYLENE:[id number]    Printed on:19 0609   Medication Information                      ibuprofen (MOTRIN) 600 MG Tab  Take 1 Tab by mouth every 6 hours as needed (For cramping after delivery; do not give if patient is receiving ketorolac (Toradol)).             oxyCODONE-acetaminophen (PERCOCET) 5-325 MG Tab  Take 1 Tab by mouth every 6 hours as needed for up to 3 days.             Prenatal MV-Min-Fe Fum-FA-DHA (PRENATAL 1 PO)  Take  by mouth.                 Follow up: 6 weeks with Dr Working    Complications:none    Aaron Zuluaga M.D.    "

## 2019-07-18 NOTE — DISCHARGE INSTRUCTIONS
POSTPARTUM DISCHARGE INSTRUCTIONS FOR MOM    YOB: 1985   Age: 33 y.o.               Admit Date: 7/17/2019     Discharge Date: 7/18/2019  Attending Doctor:  Laquita Adamson M.D.                  Allergies:  Meat extract    Discharged to home by car. Discharged via wheelchair, hospital escort: Yes.  Special equipment needed: Not Applicable  Belongings with: Personal  Be sure to schedule a follow-up appointment with your primary care doctor or any specialists as instructed.     Discharge Plan:   Diet Plan: Discussed  Activity Level: Discussed  Confirmed Follow up Appointment: Patient to Call and Schedule Appointment  Confirmed Symptoms Management: Discussed  Medication Reconciliation Updated: Yes  Influenza Vaccine Indication: Not indicated: Previously immunized this influenza season and > 8 years of age    REASONS TO CALL YOUR OBSTETRICIAN:  1.   Persistent fever or shaking chills (Temperature higher than 100.4)  2.   Heavy bleeding (soaking more than 1 pad per hour); Passing clots  3.   Foul odor from vagina  4.   Mastitis (Breast infection; breast pain, chills, fever, redness)  5.   Urinary pain, burning or frequency  6.   Episiotomy infection  7.   Abdominal incision infection  8.   Severe depression longer than 24 hours    HAND WASHING  · Prior to handling the baby.  · Before breastfeeding or bottle feeding baby.  · After using the bathroom or changing the baby's diaper.     VAGINAL CARE  · Nothing inside vagina for 6 weeks: no sexual intercourse, tampons or douching.  · Bleeding may continue for 2-4 weeks.  Amount may vary.    · Call your physician for heavy bleeding which means soaking more than 1 pad per hour    BIRTH CONTROL  · It is possible to become pregnant at any time after delivery and while breastfeeding.  · Plan to discuss a method of birth control with your physician at your follow up visit. visit.    DIET AND ELIMINATION  · Eating more fiber (bran cereal, fruits, and vegetables) and  "drinking plenty of fluids will help to avoid constipation.  · Urinary frequency after childbirth is normal.    POSTPARTUM BLUES  During the first few days after birth, you may experience a sense of the \"blues\" which may include impatience, irritability or even crying.  These feeling come and go quickly.  However, as many as 1 in 10 women experience emotional symptoms known as postpartum depression.    Postpartum depression:  May start as early as the second or third day after delivery or take several weeks or months to develop.  Symptoms of \"blues\" are present, but are more intense:  Crying spells; loss of appetite; feelings of hopelessness or loss of control; fear of touching the baby; over concern or no concern at all about the baby; little or no concern about your own appearance/caring for yourself; and/or inability to sleep or excessive sleeping.  Contact your physician if you are experiencing any of these symptoms.    Crisis Hotline:  · [place] Crisis Hotline:  6-300-DZMZMMP  Or 1-232.279.5693  · [place] Crisis Hotline:  1-152.115.9509  Or 319-832-4059    QUIT SMOKING/TOBACCO USE:  I understand the use of any tobacco products increases my chance of suffering from future heart disease and could cause other illnesses which may shorten my life. Quitting the use of tobacco products is the single most important thing I can do to improve my health. For further information on smoking / tobacco cessation call a Toll Free Quit Line at 1-304.643.4320 (*[employer] [place]) or 1-208.259.2099 (American Lung Association) or you can access the web based program at www.lungusa.org.    · [place] Tobacco Users Help Line:  (943) 837-6653       Toll Free: 1-314.930.2882  · Quit Tobacco Program [facility] (412)467-2684    DEPRESSION / SUICIDE RISK:  As you are discharged from this [facility], it is important to learn how to keep safe from harming yourself.    Recognize the warning " signs:  · Abrupt changes in personality, positive or negative- including increase in energy   · Giving away possessions  · Change in eating patterns- significant weight changes-  positive or negative  · Change in sleeping patterns- unable to sleep or sleeping all the time   · Unwillingness or inability to communicate  · Depression  · Unusual sadness, discouragement and loneliness  · Talk of wanting to die  · Neglect of personal appearance   · Rebelliousness- reckless behavior  · Withdrawal from people/activities they love  · Confusion- inability to concentrate     If you or a loved one observes any of these behaviors or has concerns about self-harm, here's what you can do:  · Talk about it- your feelings and reasons for harming yourself  · Remove any means that you might use to hurt yourself (examples: pills, rope, extension cords, firearm)  · Get professional help from the community (Mental Health, Substance Abuse, psychological counseling)  · Do not be alone:Call your Safe Contact- someone whom you trust who will be there for you.  · Call your local CRISIS HOTLINE 080-6646 or 051-180-7499  · Call your local Children's Mobile Crisis Response Team [place] (773) 585-4174 or www.[employer]  · Call the toll free National Suicide Prevention Hotlines   · National Suicide Prevention Lifeline 095-507-GLMQ (5517)  · National Hope Line Network 800-SUICIDE (249-4313)    DISCHARGE SURVEY:  Thank you for choosing [facility].  We hope we provided you with very good care.  You may be receiving a survey in the mail.  Please fill it out.  Your opinion is valuable to us.    ADDITIONAL EDUCATIONAL MATERIALS GIVEN TO PATIENT:        My signature on this form indicates that:  1.  I have reviewed and understand the above information  2.  My questions regarding this information have been answered to my satisfaction.  3.  I have formulated a plan with my discharge nurse to obtain my prescribed medication for home.

## 2019-07-18 NOTE — LACTATION NOTE
This note was copied from a baby's chart.  MOB is a multip who has strong history of Breastfeeding her first two without difficulty. [place] breastfeeding video web info given with encouragement to watch as needed to reinforce info on latching the infant. POC is to discharge to home breastfeeding on cue. Info given for the Breastfeeding [place] with encouragement to attend.

## 2019-07-19 NOTE — PROGRESS NOTES
Assessment complete. VSS- Fundus firm, lochia scant. Patient ambulating and voiding without difficulty. POC discussed at bedside. Feeding plan discussed; helped infant to latch and pt will call for next feed. Waiting on infant to void and then pt will discharge. Call light within reach.   2040- Infant voided. ID bands verified, education reviewed, cuddles removed, car seat checked. Patient already has picked up prescriptions. Walked out by CNA.

## 2019-09-15 ENCOUNTER — OFFICE VISIT (OUTPATIENT)
Dept: URGENT CARE | Facility: CLINIC | Age: 34
End: 2019-09-15
Payer: COMMERCIAL

## 2019-09-15 VITALS
HEIGHT: 62 IN | HEART RATE: 76 BPM | OXYGEN SATURATION: 100 % | RESPIRATION RATE: 18 BRPM | WEIGHT: 127 LBS | TEMPERATURE: 98.1 F | BODY MASS INDEX: 23.37 KG/M2 | SYSTOLIC BLOOD PRESSURE: 110 MMHG | DIASTOLIC BLOOD PRESSURE: 60 MMHG

## 2019-09-15 DIAGNOSIS — J06.9 VIRAL URI WITH COUGH: ICD-10-CM

## 2019-09-15 PROCEDURE — 99214 OFFICE O/P EST MOD 30 MIN: CPT | Performed by: PHYSICIAN ASSISTANT

## 2019-09-15 RX ORDER — BENZONATATE 100 MG/1
100-200 CAPSULE ORAL 3 TIMES DAILY PRN
Qty: 60 CAP | Refills: 0 | Status: SHIPPED | OUTPATIENT
Start: 2019-09-15 | End: 2020-01-03

## 2019-09-15 ASSESSMENT — ENCOUNTER SYMPTOMS
NAUSEA: 0
ABDOMINAL PAIN: 0
WHEEZING: 0
MUSCULOSKELETAL NEGATIVE: 1
SHORTNESS OF BREATH: 0
DIZZINESS: 0
DIARRHEA: 0
VOMITING: 0
HEMOPTYSIS: 0
RHINORRHEA: 1
COUGH: 1
SINUS PAIN: 1
CHILLS: 0
FEVER: 0
SORE THROAT: 0
SPUTUM PRODUCTION: 0

## 2019-09-15 ASSESSMENT — COPD QUESTIONNAIRES: COPD: 0

## 2019-09-15 NOTE — PROGRESS NOTES
"Subjective:      Lela Olivas is a 33 y.o. female who presents with Cough (x2wks, cough, sinus pain and congestion, runny nose, scratchy throat)            Cough   This is a new problem. The current episode started 1 to 4 weeks ago (2 weeks). The problem has been unchanged. The problem occurs every few minutes. The cough is productive of sputum. Associated symptoms include nasal congestion, postnasal drip and rhinorrhea. Pertinent negatives include no chest pain, chills, ear pain, fever, hemoptysis, rash, sore throat, shortness of breath or wheezing. Nothing aggravates the symptoms. She has tried nothing for the symptoms. There is no history of asthma, COPD or pneumonia.     Patient denies fevers, chills, body aches, chest pain, wheezing, or SOB. No history of chronic lung disease or pneumonia. No history of smoking. No known sick contacts or recent use of antibiotics.     Review of Systems   Constitutional: Negative for chills and fever.   HENT: Positive for congestion, postnasal drip, rhinorrhea and sinus pain. Negative for ear pain and sore throat.    Respiratory: Positive for cough. Negative for hemoptysis, sputum production, shortness of breath and wheezing.    Cardiovascular: Negative for chest pain.   Gastrointestinal: Negative for abdominal pain, diarrhea, nausea and vomiting.   Genitourinary: Negative.    Musculoskeletal: Negative.    Skin: Negative for rash.   Neurological: Negative for dizziness.        Objective:     /60 (BP Location: Left arm, Patient Position: Sitting, BP Cuff Size: Adult)   Pulse 76   Temp 36.7 °C (98.1 °F) (Temporal)   Resp 18   Ht 1.575 m (5' 2\")   Wt 57.6 kg (127 lb)   LMP 09/25/2018 (Exact Date)   SpO2 100%   Breastfeeding? Yes   BMI 23.23 kg/m²      Physical Exam   Constitutional: She is oriented to person, place, and time. She appears well-developed and well-nourished. No distress.   HENT:   Head: Normocephalic and atraumatic.   Right Ear: Hearing, " tympanic membrane, external ear and ear canal normal.   Left Ear: Hearing, tympanic membrane, external ear and ear canal normal.   Nose: Mucosal edema present.   Mouth/Throat: Oropharynx is clear and moist. No oropharyngeal exudate, posterior oropharyngeal edema or posterior oropharyngeal erythema.   Eyes: Pupils are equal, round, and reactive to light. Conjunctivae are normal. Right eye exhibits no discharge. Left eye exhibits no discharge.   Neck: Normal range of motion.   Cardiovascular: Normal rate, regular rhythm and normal heart sounds.   No murmur heard.  Pulmonary/Chest: Effort normal and breath sounds normal. No stridor. No respiratory distress. She has no wheezes.   Musculoskeletal: Normal range of motion.   Neurological: She is alert and oriented to person, place, and time.   Skin: Skin is warm and dry. She is not diaphoretic.   Psychiatric: She has a normal mood and affect. Her behavior is normal.   Nursing note and vitals reviewed.         PMH:  has a past medical history of Anxiety, Insomnia, Migraine, and No known health problems.  MEDS:   Current Outpatient Medications:   •  benzonatate (TESSALON) 100 MG Cap, Take 1-2 Caps by mouth 3 times a day as needed., Disp: 60 Cap, Rfl: 0  •  ibuprofen (MOTRIN) 600 MG Tab, Take 1 Tab by mouth every 6 hours as needed (For cramping after delivery; do not give if patient is receiving ketorolac (Toradol))., Disp: 30 Tab, Rfl: 0  •  Prenatal MV-Min-Fe Fum-FA-DHA (PRENATAL 1 PO), Take  by mouth., Disp: , Rfl:   ALLERGIES:   Allergies   Allergen Reactions   • Meat Extract      Pt eats VEGAN diet, NO MEAT     SURGHX:   Past Surgical History:   Procedure Laterality Date   • OTHER      cyst removed   • OTHER      not sure, soft tissue or skin lesion      SOCHX:  reports that she has never smoked. She has never used smokeless tobacco. She reports that she does not drink alcohol or use drugs.  FH: family history includes Cancer in her maternal uncle and paternal  grandfather; Diabetes in her father; Heart Disease in her maternal uncle; Psychiatric Illness in her mother.     Assessment/Plan:     1. Viral URI with cough  - benzonatate (TESSALON) 100 MG Cap; Take 1-2 Caps by mouth 3 times a day as needed.  Dispense: 60 Cap; Refill: 0    Advised patient symptoms are most likely viral in etiology, recommend supportive care. Increased fluids and rest. Tessalon perles as needed for symptomatic relief. Call or return to office if symptoms persist or worsen. The patient demonstrated a good understanding and agreed with the treatment plan.

## 2019-12-10 ENCOUNTER — HOSPITAL ENCOUNTER (OUTPATIENT)
Dept: LAB | Facility: MEDICAL CENTER | Age: 34
End: 2019-12-10
Attending: OBSTETRICS & GYNECOLOGY
Payer: COMMERCIAL

## 2019-12-10 PROCEDURE — 85027 COMPLETE CBC AUTOMATED: CPT

## 2019-12-11 LAB
ERYTHROCYTE [DISTWIDTH] IN BLOOD BY AUTOMATED COUNT: 41.6 FL (ref 35.9–50)
HCT VFR BLD AUTO: 43.8 % (ref 37–47)
HGB BLD-MCNC: 13.9 G/DL (ref 12–16)
MCH RBC QN AUTO: 27.9 PG (ref 27–33)
MCHC RBC AUTO-ENTMCNC: 31.7 G/DL (ref 33.6–35)
MCV RBC AUTO: 87.8 FL (ref 81.4–97.8)
PLATELET # BLD AUTO: 254 K/UL (ref 164–446)
PMV BLD AUTO: 9.9 FL (ref 9–12.9)
RBC # BLD AUTO: 4.99 M/UL (ref 4.2–5.4)
WBC # BLD AUTO: 8.7 K/UL (ref 4.8–10.8)

## 2020-01-03 ENCOUNTER — OFFICE VISIT (OUTPATIENT)
Dept: MEDICAL GROUP | Facility: PHYSICIAN GROUP | Age: 35
End: 2020-01-03
Payer: COMMERCIAL

## 2020-01-03 VITALS
WEIGHT: 135.8 LBS | HEIGHT: 62 IN | RESPIRATION RATE: 16 BRPM | OXYGEN SATURATION: 97 % | DIASTOLIC BLOOD PRESSURE: 70 MMHG | SYSTOLIC BLOOD PRESSURE: 102 MMHG | HEART RATE: 88 BPM | BODY MASS INDEX: 24.99 KG/M2 | TEMPERATURE: 97.7 F

## 2020-01-03 DIAGNOSIS — O92.4 DECREASED LACTATION: ICD-10-CM

## 2020-01-03 PROCEDURE — 99213 OFFICE O/P EST LOW 20 MIN: CPT | Performed by: PHYSICIAN ASSISTANT

## 2020-01-03 ASSESSMENT — PATIENT HEALTH QUESTIONNAIRE - PHQ9: CLINICAL INTERPRETATION OF PHQ2 SCORE: 0

## 2020-01-10 NOTE — PROGRESS NOTES
Chief Complaint   Patient presents with   • Establish Care       HISTORY OF PRESENT ILLNESS: Lela Olivas is an established 34 y.o. female here to discuss the evaluation and management of:    Patient is a pleasant 34-year-old female here today to discuss lactation concerns.  She tells me that she gave birth to a healthy boy in 2019.  States in 2019 Mirena IUD was placed.  States her last OB/GYN appointment was in 2019.  She tells me since November she has noticed a few days of the week she is not producing as much milk.  She tells me she started back work in November and works day shift.  States she works 5 days a week 5 hours max for work shift.  States during work shift she is not breast-feeding as frequently.  States on her days off she notices that milk productio is back to normal and her breast feel zamora on her days off.  States she is able to feed her son every 2-3 hours at least 5 days a week.  On the days that she cannot feed him as frequently as when she notices decreased milk production.  States her baby is latching appropriately and feels that this is not the issue.  She admits that she needs to work on hydration.  Patient is inquiring about treatment options.      Patient Active Problem List    Diagnosis Date Noted   • Pain related to vaginal delivery 2019     Priority: High   •  (spontaneous vaginal delivery) 2019   • Weight loss 2018   • Menorrhagia with irregular cycle 2018   • Grade I hemorrhoids 2018   • Anxiety 2018       Allergies:Meat extract    Current Outpatient Medications   Medication Sig Dispense Refill   • Acetaminophen (TYLENOL 8 HOUR ARTHRITIS PAIN PO) Take  by mouth.     • OMEPRAZOLE PO Take  by mouth.     • Prenatal MV-Min-Fe Fum-FA-DHA (PRENATAL 1 PO) Take  by mouth.       No current facility-administered medications for this visit.        Social History     Tobacco Use   • Smoking status: Never Smoker   •  "Smokeless tobacco: Never Used   Substance Use Topics   • Alcohol use: No   • Drug use: No       Family Status   Relation Name Status   • Mo  Alive   • Fa  Alive   • MUnc  (Not Specified)   • PGFa  (Not Specified)   • MUnc  (Not Specified)   • Neg Hx  (Not Specified)     Family History   Problem Relation Age of Onset   • Psychiatric Illness Mother         depression   • Diabetes Father    • Cancer Maternal Uncle         lung cancer, smoker   • Cancer Paternal Grandfather         brain cancer   • Heart Disease Maternal Uncle         MI   • Stroke Neg Hx    • Alcohol/Drug Neg Hx        ROS:  Review of Systems   Constitutional: Negative for fever, chills, weight loss and malaise/fatigue.   HENT: Negative for ear pain, nosebleeds, congestion, sore throat and neck pain.    Eyes: Negative for blurred vision.   Respiratory: Negative for cough, sputum production, shortness of breath and wheezing.    Cardiovascular: Negative for chest pain, palpitations, orthopnea and leg swelling.   Gastrointestinal: Negative for heartburn, nausea, vomiting and abdominal pain.   Genitourinary: Negative for dysuria, urgency and frequency.   Musculoskeletal: Negative for myalgias, back pain and joint pain.   Skin: Negative for rash and itching.   Neurological: Negative for dizziness, tingling, tremors, sensory change, focal weakness and headaches.   Endo/Heme/Allergies: Does not bruise/bleed easily.   Psychiatric/Behavioral: Negative for depression, suicidal ideas and memory loss.  The patient is not nervous/anxious and does not have insomnia.    All other systems reviewed and are negative except as in HPI.    Exam: /70 (BP Location: Left arm, Patient Position: Sitting)   Pulse 88   Temp 36.5 °C (97.7 °F) (Temporal)   Resp 16   Ht 1.575 m (5' 2\")   Wt 61.6 kg (135 lb 12.8 oz)   SpO2 97%  Body mass index is 24.84 kg/m².  General: Normal appearing. No distress.  HEENT: Normocephalic. Eyes conjunctiva clear lids without ptosis, pupils " equal and reactive to light accommodation, ears normal shape and contour, canals are clear bilaterally, tympanic membranes are benign, nasal mucosa benign, oropharynx is without erythema, edema or exudates.   Neck: Supple without JVD. Thyroid is not enlarged.  Pulmonary: Clear to ausculation.  Normal effort. No rales, ronchi, or wheezing.  Cardiovascular: Regular rate and rhythm without murmur.   Abdomen: Soft, nontender, nondistended. Normal bowel sounds. Liver and spleen are not palpable  Neurologic: Grossly nonfocal.  Cranial nerves are normal.   Lymph: No cervical or supraclavicular lymph nodes are palpable  Skin: Warm and dry.  No rashes or suspicious skin lesions.  Musculoskeletal: Normal gait. No extremity cyanosis, clubbing, or edema.  Psych: Normal mood and affect. Alert and oriented x3. Judgment and insight is normal.    Medical decision-making and discussion:  1. Decreased lactation  Advised patient to find a consistent routine.  Suggested high-fat dairy, oatmeal, and other food items to help improve milk production.  Emphasized importance of staying hydrated.  Decrease caffeine consumption.  Continue to monitor.    Follow-up for worsening symptoms,lack of expected recovery, or should new symptoms or problems arise.      Please note that this dictation was created using voice recognition software. I have made every reasonable attempt to correct obvious errors, but I expect that there are errors of grammar and possibly content that I did not discover before finalizing the note.    Assessment/Plan:  1. Decreased lactation         Return in about 1 year (around 1/3/2021), or if symptoms worsen or fail to improve.

## 2020-02-12 ENCOUNTER — OFFICE VISIT (OUTPATIENT)
Dept: URGENT CARE | Facility: CLINIC | Age: 35
End: 2020-02-12
Payer: COMMERCIAL

## 2020-02-12 VITALS
RESPIRATION RATE: 16 BRPM | BODY MASS INDEX: 24.84 KG/M2 | HEIGHT: 62 IN | DIASTOLIC BLOOD PRESSURE: 70 MMHG | SYSTOLIC BLOOD PRESSURE: 112 MMHG | WEIGHT: 135 LBS | TEMPERATURE: 97.7 F | HEART RATE: 64 BPM | OXYGEN SATURATION: 100 %

## 2020-02-12 DIAGNOSIS — J11.1 INFLUENZA-LIKE ILLNESS: ICD-10-CM

## 2020-02-12 PROCEDURE — 99213 OFFICE O/P EST LOW 20 MIN: CPT | Performed by: PHYSICIAN ASSISTANT

## 2020-02-12 ASSESSMENT — ENCOUNTER SYMPTOMS
ANOREXIA: 0
ABDOMINAL PAIN: 0
WHEEZING: 0
CHILLS: 1
CHANGE IN BOWEL HABIT: 0
EYE REDNESS: 0
HEADACHES: 1
DIZZINESS: 0
COUGH: 1
EYE DISCHARGE: 0
PALPITATIONS: 0
FEVER: 1
FATIGUE: 1
NAUSEA: 0
SORE THROAT: 1
SPUTUM PRODUCTION: 0
SHORTNESS OF BREATH: 0
DIARRHEA: 0
VOMITING: 0
MYALGIAS: 1

## 2020-11-20 ENCOUNTER — HOSPITAL ENCOUNTER (OUTPATIENT)
Facility: MEDICAL CENTER | Age: 35
End: 2020-11-20
Attending: NURSE PRACTITIONER
Payer: COMMERCIAL

## 2020-11-20 PROCEDURE — U0003 INFECTIOUS AGENT DETECTION BY NUCLEIC ACID (DNA OR RNA); SEVERE ACUTE RESPIRATORY SYNDROME CORONAVIRUS 2 (SARS-COV-2) (CORONAVIRUS DISEASE [COVID-19]), AMPLIFIED PROBE TECHNIQUE, MAKING USE OF HIGH THROUGHPUT TECHNOLOGIES AS DESCRIBED BY CMS-2020-01-R: HCPCS

## 2020-11-22 LAB — COVID ORDER STATUS COVID19: NORMAL

## 2020-11-23 LAB
SARS-COV-2 RNA RESP QL NAA+PROBE: NOTDETECTED
SPECIMEN SOURCE: NORMAL

## 2021-02-02 ENCOUNTER — HOSPITAL ENCOUNTER (OUTPATIENT)
Facility: MEDICAL CENTER | Age: 36
End: 2021-02-02
Attending: PHYSICIAN ASSISTANT
Payer: COMMERCIAL

## 2021-02-02 PROCEDURE — U0003 INFECTIOUS AGENT DETECTION BY NUCLEIC ACID (DNA OR RNA); SEVERE ACUTE RESPIRATORY SYNDROME CORONAVIRUS 2 (SARS-COV-2) (CORONAVIRUS DISEASE [COVID-19]), AMPLIFIED PROBE TECHNIQUE, MAKING USE OF HIGH THROUGHPUT TECHNOLOGIES AS DESCRIBED BY CMS-2020-01-R: HCPCS

## 2021-02-02 PROCEDURE — U0005 INFEC AGEN DETEC AMPLI PROBE: HCPCS

## 2021-02-03 LAB
COVID ORDER STATUS COVID19: NORMAL
SARS-COV-2 RNA RESP QL NAA+PROBE: NOTDETECTED
SPECIMEN SOURCE: NORMAL

## 2021-05-04 NOTE — L&D DELIVERY NOTE
"Delivery Summary    Lela Olivas is a 34 yo  who presented at 39w0d in labor. She received an epidural for anesthesia. She underwent AROM. She progressed to complete dilation and with pushing efforts she underwent a spontaneous vaginal delivery of a viable male infant \"Nayeem\" in ALAINA position with APGARS 8/9. The head delivered atraumatically. The anterior shoulder delivered with gentle downward pressure and the remainder of the body followed. The infant was placed on the maternal chest. The cord was clamped and cut after a 30 second delay. Pitocin was administered. The placenta was delivered with gentle pressure. The uterus firmed with fundal pressure and pitocin. The perineum was examined and a first degree laceration was repaired with 3.0 chromic in the usual fashion.    EBL: 300cc  Anesthesia: epidural  Complications: None    Aaron Zuluaga M.D.      "
The patient is a 73y Female complaining of fall.

## 2021-09-12 ENCOUNTER — OFFICE VISIT (OUTPATIENT)
Dept: URGENT CARE | Facility: CLINIC | Age: 36
End: 2021-09-12
Payer: COMMERCIAL

## 2021-09-12 ENCOUNTER — HOSPITAL ENCOUNTER (OUTPATIENT)
Facility: MEDICAL CENTER | Age: 36
End: 2021-09-12
Attending: PHYSICIAN ASSISTANT
Payer: COMMERCIAL

## 2021-09-12 VITALS
WEIGHT: 134 LBS | BODY MASS INDEX: 23.74 KG/M2 | DIASTOLIC BLOOD PRESSURE: 74 MMHG | HEIGHT: 63 IN | HEART RATE: 78 BPM | SYSTOLIC BLOOD PRESSURE: 120 MMHG | OXYGEN SATURATION: 98 % | RESPIRATION RATE: 16 BRPM | TEMPERATURE: 98.2 F

## 2021-09-12 DIAGNOSIS — R05.9 COUGH: ICD-10-CM

## 2021-09-12 DIAGNOSIS — J02.0 STREP PHARYNGITIS: ICD-10-CM

## 2021-09-12 DIAGNOSIS — Z20.822 SUSPECTED COVID-19 VIRUS INFECTION: ICD-10-CM

## 2021-09-12 DIAGNOSIS — Z71.89 EDUCATED ABOUT COVID-19 VIRUS INFECTION: ICD-10-CM

## 2021-09-12 LAB
INT CON NEG: NEGATIVE
INT CON POS: POSITIVE
S PYO AG THROAT QL: POSITIVE

## 2021-09-12 PROCEDURE — U0003 INFECTIOUS AGENT DETECTION BY NUCLEIC ACID (DNA OR RNA); SEVERE ACUTE RESPIRATORY SYNDROME CORONAVIRUS 2 (SARS-COV-2) (CORONAVIRUS DISEASE [COVID-19]), AMPLIFIED PROBE TECHNIQUE, MAKING USE OF HIGH THROUGHPUT TECHNOLOGIES AS DESCRIBED BY CMS-2020-01-R: HCPCS

## 2021-09-12 PROCEDURE — 87880 STREP A ASSAY W/OPTIC: CPT | Performed by: PHYSICIAN ASSISTANT

## 2021-09-12 PROCEDURE — 99214 OFFICE O/P EST MOD 30 MIN: CPT | Mod: CS | Performed by: PHYSICIAN ASSISTANT

## 2021-09-12 PROCEDURE — U0005 INFEC AGEN DETEC AMPLI PROBE: HCPCS

## 2021-09-12 RX ORDER — AMOXICILLIN 500 MG/1
500 CAPSULE ORAL 2 TIMES DAILY
Qty: 20 CAPSULE | Refills: 0 | Status: SHIPPED | OUTPATIENT
Start: 2021-09-12 | End: 2021-09-22

## 2021-09-12 ASSESSMENT — ENCOUNTER SYMPTOMS
NAUSEA: 0
SHORTNESS OF BREATH: 0
VOMITING: 0
DIARRHEA: 0
FEVER: 1
SORE THROAT: 1
WHEEZING: 0
CHILLS: 1

## 2021-09-13 DIAGNOSIS — Z20.822 SUSPECTED COVID-19 VIRUS INFECTION: ICD-10-CM

## 2021-09-13 NOTE — PROGRESS NOTES
Subjective:   Lela Olivas is a 35 y.o. female who presents for Pharyngitis (only in the morning, runny nose, sneezing, low grade fever, occasional cough, would like covid test)      HPI  35 y.o. female presents to urgent care with new problem to provider of sore throat, coughing, sneezing, runny nose, subjective fevers onset about 3 days ago.  Vaccinated for COVID. Denies confirmed exposure to COVID, but family members have similar symptoms.   Patient is taking ibuprofen, tylenol.   Denies other associated aggravating or alleviating factors.     Review of Systems   Constitutional: Positive for chills, fever and malaise/fatigue.   HENT: Positive for congestion and sore throat.    Respiratory: Negative for shortness of breath and wheezing.    Cardiovascular: Negative for chest pain.   Gastrointestinal: Negative for diarrhea, nausea and vomiting.       Patient Active Problem List   Diagnosis   • Weight loss   • Menorrhagia with irregular cycle   • Grade I hemorrhoids   • Anxiety   •  (spontaneous vaginal delivery)   • Pain related to vaginal delivery     Past Surgical History:   Procedure Laterality Date   • OTHER      cyst removed   • OTHER      not sure, soft tissue or skin lesion      Social History     Tobacco Use   • Smoking status: Never Smoker   • Smokeless tobacco: Never Used   Vaping Use   • Vaping Use: Never used   Substance Use Topics   • Alcohol use: No   • Drug use: No      Family History   Problem Relation Age of Onset   • Psychiatric Illness Mother         depression   • Diabetes Father    • Cancer Maternal Uncle         lung cancer, smoker   • Cancer Paternal Grandfather         brain cancer   • Heart Disease Maternal Uncle         MI   • Stroke Neg Hx    • Alcohol/Drug Neg Hx       (Allergies, Medications, & Tobacco/Substance Use were reconciled by the Medical Assistant and reviewed by myself. The family history is prepopulated)     Objective:     /74   Pulse 78   Temp 36.8 °C (98.2  "°F) (Temporal)   Resp 16   Ht 1.6 m (5' 3\")   Wt 60.8 kg (134 lb)   SpO2 98%   BMI 23.74 kg/m²     Physical Exam  Vitals reviewed.   Constitutional:       General: She is not in acute distress.     Appearance: Normal appearance. She is not ill-appearing or diaphoretic.   HENT:      Head: Normocephalic and atraumatic.      Nose: Nose normal.      Mouth/Throat:      Mouth: Mucous membranes are moist.      Pharynx: Posterior oropharyngeal erythema present. No oropharyngeal exudate.   Eyes:      Conjunctiva/sclera: Conjunctivae normal.   Cardiovascular:      Rate and Rhythm: Normal rate and regular rhythm.      Heart sounds: Normal heart sounds. No murmur heard.   No friction rub. No gallop.    Pulmonary:      Effort: Pulmonary effort is normal. No respiratory distress.      Breath sounds: Normal breath sounds. No wheezing, rhonchi or rales.   Musculoskeletal:         General: Normal range of motion.      Cervical back: Normal range of motion and neck supple.   Lymphadenopathy:      Cervical: Cervical adenopathy present.   Skin:     General: Skin is warm and dry.      Findings: No rash.   Neurological:      General: No focal deficit present.      Mental Status: She is alert and oriented to person, place, and time.   Psychiatric:         Mood and Affect: Mood normal.         Behavior: Behavior normal.         Thought Content: Thought content normal.         Judgment: Judgment normal.         Assessment/Plan:     1. Suspected COVID-19 virus infection  COVID/SARS CoV-2 PCR   2. Educated about COVID-19 virus infection     3. Cough     4. Strep pharyngitis  POCT Rapid Strep A    amoxicillin (AMOXIL) 500 MG Cap     Results for orders placed or performed in visit on 09/12/21   POCT Rapid Strep A   Result Value Ref Range    Rapid Strep Screen Positive     Internal Control Positive Positive     Internal Control Negative Negative        Patient symptoms are most likely related to strep pharyngitis, however instructed to " self-isolate/quarantine per CDC guidelines.  I will follow-up pending COVID-19 testing. Discussed with patient may obtain hard copy of results on [employer].   increased fluids and rest. Discussed use of OTC cough and cold medication and Tylenol/Motrin for symptomatic relief.  Return for reevaluation or proceed to ED if symptoms persist or worsen. Supportive care, differential diagnoses, and indications for immediate follow-up discussed with patient. Patient should to proceed to ED for development of symptoms including but not limited to shortness of breath breath, difficulty breathing, or worsening symptoms not manageable at home.   Vital signs stable, patient in no acute respiratory distress.  COVID-19 discharge instructions and CDC guidelines provided to patient in AVS.      Differential diagnosis, natural history, supportive care, and indications for immediate follow-up discussed.    Advised the patient to follow-up with the primary care physician for recheck, reevaluation, and consideration of further management.  Patient verbalized understanding of treatment plan and has no further questions regarding care.   This patient is evaluated under [facility] isolation protocols in urgent care.  Out of an abundance of caution I am wearing a N95 mask, protective eye gear, and gloves through all interaction with patient.    I personally reviewed prior external notes and test results pertinent to today's visit.     Please note that this dictation was created using voice recognition software. I have made a reasonable attempt to correct obvious errors, but I expect that there are errors of grammar and possibly content that I did not discover before finalizing the note.    This note was electronically signed by Janae Martinez PA-C

## 2021-09-14 RX ORDER — BENZONATATE 200 MG/1
200 CAPSULE ORAL 3 TIMES DAILY PRN
Qty: 30 CAPSULE | Refills: 0 | Status: SHIPPED | OUTPATIENT
Start: 2021-09-14 | End: 2021-11-09

## 2021-11-09 ENCOUNTER — HOSPITAL ENCOUNTER (OUTPATIENT)
Facility: MEDICAL CENTER | Age: 36
End: 2021-11-09
Attending: NURSE PRACTITIONER
Payer: COMMERCIAL

## 2021-11-09 ENCOUNTER — OFFICE VISIT (OUTPATIENT)
Dept: URGENT CARE | Facility: CLINIC | Age: 36
End: 2021-11-09
Payer: COMMERCIAL

## 2021-11-09 VITALS
WEIGHT: 131 LBS | HEIGHT: 63 IN | OXYGEN SATURATION: 97 % | DIASTOLIC BLOOD PRESSURE: 68 MMHG | TEMPERATURE: 97.6 F | SYSTOLIC BLOOD PRESSURE: 110 MMHG | HEART RATE: 70 BPM | BODY MASS INDEX: 23.21 KG/M2 | RESPIRATION RATE: 17 BRPM

## 2021-11-09 DIAGNOSIS — J06.9 UPPER RESPIRATORY INFECTION, ACUTE: Primary | ICD-10-CM

## 2021-11-09 DIAGNOSIS — R05.9 COUGH: ICD-10-CM

## 2021-11-09 DIAGNOSIS — R07.89 CHEST WALL PAIN: ICD-10-CM

## 2021-11-09 DIAGNOSIS — R06.2 WHEEZING: ICD-10-CM

## 2021-11-09 PROCEDURE — 99214 OFFICE O/P EST MOD 30 MIN: CPT | Mod: 25 | Performed by: NURSE PRACTITIONER

## 2021-11-09 PROCEDURE — U0005 INFEC AGEN DETEC AMPLI PROBE: HCPCS

## 2021-11-09 PROCEDURE — 94640 AIRWAY INHALATION TREATMENT: CPT | Performed by: NURSE PRACTITIONER

## 2021-11-09 PROCEDURE — 94664 DEMO&/EVAL PT USE INHALER: CPT | Mod: 59 | Performed by: NURSE PRACTITIONER

## 2021-11-09 PROCEDURE — U0003 INFECTIOUS AGENT DETECTION BY NUCLEIC ACID (DNA OR RNA); SEVERE ACUTE RESPIRATORY SYNDROME CORONAVIRUS 2 (SARS-COV-2) (CORONAVIRUS DISEASE [COVID-19]), AMPLIFIED PROBE TECHNIQUE, MAKING USE OF HIGH THROUGHPUT TECHNOLOGIES AS DESCRIBED BY CMS-2020-01-R: HCPCS

## 2021-11-09 RX ORDER — BENZONATATE 100 MG/1
100 CAPSULE ORAL 3 TIMES DAILY PRN
Qty: 60 CAPSULE | Refills: 0 | Status: SHIPPED | OUTPATIENT
Start: 2021-11-09 | End: 2021-12-22

## 2021-11-09 RX ORDER — KETOROLAC TROMETHAMINE 30 MG/ML
30 INJECTION, SOLUTION INTRAMUSCULAR; INTRAVENOUS ONCE
Status: COMPLETED | OUTPATIENT
Start: 2021-11-09 | End: 2021-11-09

## 2021-11-09 RX ORDER — ALBUTEROL SULFATE 90 UG/1
2 AEROSOL, METERED RESPIRATORY (INHALATION) EVERY 6 HOURS PRN
Qty: 8.5 G | Refills: 0 | Status: SHIPPED | OUTPATIENT
Start: 2021-11-09 | End: 2022-01-13

## 2021-11-09 RX ORDER — ALBUTEROL SULFATE 2.5 MG/3ML
2.5 SOLUTION RESPIRATORY (INHALATION) ONCE
Status: COMPLETED | OUTPATIENT
Start: 2021-11-09 | End: 2021-11-09

## 2021-11-09 RX ADMIN — KETOROLAC TROMETHAMINE 30 MG: 30 INJECTION, SOLUTION INTRAMUSCULAR; INTRAVENOUS at 15:14

## 2021-11-09 RX ADMIN — ALBUTEROL SULFATE 2.5 MG: 2.5 SOLUTION RESPIRATORY (INHALATION) at 14:27

## 2021-11-09 ASSESSMENT — ENCOUNTER SYMPTOMS
HEADACHES: 0
DIZZINESS: 0
CHILLS: 0
SORE THROAT: 0
DIARRHEA: 0
ABDOMINAL PAIN: 0
COUGH: 1
NAUSEA: 0
SINUS PAIN: 0
SHORTNESS OF BREATH: 0
EYE DISCHARGE: 0
SPUTUM PRODUCTION: 0
ORTHOPNEA: 0
WHEEZING: 1
EYE REDNESS: 0
FEVER: 0
VOMITING: 0
MYALGIAS: 0

## 2021-11-09 ASSESSMENT — LIFESTYLE VARIABLES: SUBSTANCE_ABUSE: 0

## 2021-11-09 NOTE — PROGRESS NOTES
Lela Olivas is a 35 y.o. female who presents for Cough (cough , chest congestion , has long coughing espisodes x 1 week )      HPI Lela is a 35-year-old female presents with chest wall pain, chest congestion.  She has been coughing a lot.  She reports when she coughs her pain hurts worse across the front of her chest.  She feels like she cannot take a deep breath in.  She hears herself wheezing periodically.  She denies fever, chills, body aches.  She has had some mild nasal drainage.  Treatments tried over-the-counter cough medicine with only minimal relief.  No history of asthma.  No history of cardiac disease.  Negative family history for cardiac disease.  She denies diaphoresis, nausea, shortness of breath.  Denies orthopnea, leg swelling, calf tenderness.  No other aggravating alleviating factors.  She has no known exposure to persons with Covid infection.  She is fully vaccinated.    Review of Systems   Constitutional: Positive for malaise/fatigue. Negative for chills and fever.   HENT: Negative for congestion, sinus pain and sore throat.    Eyes: Negative for discharge and redness.   Respiratory: Positive for cough and wheezing. Negative for sputum production and shortness of breath.    Cardiovascular: Positive for chest pain (chest wall ). Negative for orthopnea and leg swelling.   Gastrointestinal: Negative for abdominal pain, diarrhea, nausea and vomiting.   Musculoskeletal: Negative for myalgias.   Neurological: Negative for dizziness and headaches.   Endo/Heme/Allergies: Negative for environmental allergies.   Psychiatric/Behavioral: Negative for substance abuse.       Allergies   Allergen Reactions   • Meat Extract      Pt eats VEGAN diet, NO MEAT     Past Medical History:   Diagnosis Date   • Anxiety    • Insomnia    • Migraine    • No known health problems      Past Surgical History:   Procedure Laterality Date   • OTHER      cyst removed   • OTHER      not sure, soft tissue or skin lesion  "     Family History   Problem Relation Age of Onset   • Psychiatric Illness Mother         depression   • Diabetes Father    • Cancer Maternal Uncle         lung cancer, smoker   • Cancer Paternal Grandfather         brain cancer   • Heart Disease Maternal Uncle         MI   • Stroke Neg Hx    • Alcohol/Drug Neg Hx      Social History     Tobacco Use   • Smoking status: Never Smoker   • Smokeless tobacco: Never Used   Substance Use Topics   • Alcohol use: No     Patient Active Problem List   Diagnosis   • Weight loss   • Menorrhagia with irregular cycle   • Grade I hemorrhoids   • Anxiety   •  (spontaneous vaginal delivery)   • Pain related to vaginal delivery     No current outpatient medications on file prior to visit.     No current facility-administered medications on file prior to visit.          Objective:     /68 (BP Location: Left arm, Patient Position: Sitting, BP Cuff Size: Adult)   Pulse 70   Temp 36.4 °C (97.6 °F) (Temporal)   Resp 17   Ht 1.6 m (5' 3\")   Wt 59.4 kg (131 lb)   SpO2 97%   BMI 23.21 kg/m²     Physical Exam  Vitals and nursing note reviewed.   Constitutional:       General: She is not in acute distress.     Appearance: Normal appearance. She is well-developed. She is not ill-appearing or toxic-appearing.   HENT:      Head: Normocephalic.      Right Ear: Hearing, ear canal and external ear normal. No middle ear effusion. Tympanic membrane is injected. Tympanic membrane is not erythematous.      Left Ear: Hearing, ear canal and external ear normal.  No middle ear effusion. Tympanic membrane is injected. Tympanic membrane is not erythematous.      Nose: Rhinorrhea present. No mucosal edema.      Right Sinus: No maxillary sinus tenderness or frontal sinus tenderness.      Left Sinus: No maxillary sinus tenderness or frontal sinus tenderness.      Mouth/Throat:      Pharynx: Uvula midline. Posterior oropharyngeal erythema present. No oropharyngeal exudate.      Tonsils: No " tonsillar abscesses.   Eyes:      Pupils: Pupils are equal, round, and reactive to light.   Neck:      Trachea: Trachea normal.   Cardiovascular:      Rate and Rhythm: Normal rate and regular rhythm.      Chest Wall: PMI is not displaced.      Pulses: Normal pulses.      Heart sounds: Normal heart sounds.   Pulmonary:      Effort: Pulmonary effort is normal. No accessory muscle usage or respiratory distress.      Breath sounds: Wheezing present. No decreased breath sounds, rhonchi or rales.   Chest:      Chest wall: Tenderness present. No edema.   Breasts:      Right: No supraclavicular adenopathy.      Left: No supraclavicular adenopathy.         Abdominal:      Palpations: Abdomen is soft.      Tenderness: There is no abdominal tenderness.   Musculoskeletal:         General: Normal range of motion.      Cervical back: Full passive range of motion without pain, normal range of motion and neck supple.      Right lower leg: No edema.      Left lower leg: No edema.   Lymphadenopathy:      Cervical: No cervical adenopathy.      Upper Body:      Right upper body: No supraclavicular adenopathy.      Left upper body: No supraclavicular adenopathy.   Skin:     General: Skin is warm and dry.   Neurological:      Mental Status: She is alert and oriented to person, place, and time.      Gait: Gait normal.   Psychiatric:         Speech: Speech normal.         Behavior: Behavior normal.     Demonstration &/or evaluation of pt utilization of a nebulizer and evaluation of results. Pt tolerated well. No adverse events. SpO2 post treatment maintained. Decreased wheezing. Reports improved WOB with increased Vt.     .Toradol 30 mg IM given in UC today     Assessment /Associated Orders:      1. Upper respiratory infection, acute     2. Chest wall pain  SARS-CoV-2 PCR (24 hour In-House): Collect NP swab in VTM    albuterol (PROVENTIL) 2.5mg/3ml nebulizer solution 2.5 mg    ketorolac (TORADOL) injection 30 mg   3. Wheezing  SARS-CoV-2  PCR (24 hour In-House): Collect NP swab in VTM    albuterol (PROVENTIL) 2.5mg/3ml nebulizer solution 2.5 mg    albuterol 108 (90 Base) MCG/ACT Aero Soln inhalation aerosol   4. Cough  SARS-CoV-2 PCR (24 hour In-House): Collect NP swab in VTM    albuterol (PROVENTIL) 2.5mg/3ml nebulizer solution 2.5 mg    benzonatate (TESSALON) 100 MG Cap       Medical Decision Making:    Pt is clinically stable at today's acute urgent care visit.  No acute distress noted. Appropriate for outpatient management at this time.     COVID PCR testing - pending- (results to be released to [facility] if available)   Quarantine per CDC guidelines   Educated in infection control practices.     OTC  analgesic/ antipyretic of choice ( acetaminophen or NSAID). Follow manufactures dosing and safety precautions.   OTC medications for symptomatic relief of symptoms'  Humidifier at night prn could be helpful to reduce sx.   Keep well hydrated  Increase rest  Educated in proper administration of medication(s) ordered today including safety, possible SE, risks, benefits, rationale and alternatives to therapy.   Toradol given in clinic today. Tolerated well without adverse effects. Advised not to take NSAIDS for 6-8 hours post injection.     Advised to follow-up with the primary care provider  for recheck, reevaluation, and consideration of further management if necessary.   Discussed management options (risks,benefits, and alternatives to treatment). Pt/ caregiver expressed understanding and the treatment plan was agreed upon. Questions were encouraged and answered     Return to urgent care prn if new or worsening sx or if there is no improvement in condition prn . Red flags discussed and indications to immediately call 911 or present to the Emergency Department.     I personally reviewed prior external notes and test results pertinent to today's visit.  I have independently reviewed and interpreted all diagnostics ordered during this urgent care acute  visit.   Time spent evaluating this patient was at least 30 minutes and includes preparing for visit, counseling/education, exam and evaluation, obtaining history, independent interpretation, ordering lab/test/procedures,medication management and documentation.This does not include time spent on separate billable procedures.           Please note that this dictation was created using voice recognition software. I have made a reasonable attempt to correct obvious errors, but I expect that there are errors of grammar and possibly content that I did not discover before finalizing the note.    This note was electronically signed by Radha MILLARD, Urgent Care

## 2021-11-10 DIAGNOSIS — R05.9 COUGH: ICD-10-CM

## 2021-11-10 DIAGNOSIS — R07.89 CHEST WALL PAIN: ICD-10-CM

## 2021-11-10 DIAGNOSIS — R06.2 WHEEZING: ICD-10-CM

## 2021-11-11 LAB
SARS-COV-2 RNA RESP QL NAA+PROBE: NOTDETECTED
SPECIMEN SOURCE: NORMAL

## 2021-12-15 ENCOUNTER — TELEPHONE (OUTPATIENT)
Dept: SCHEDULING | Facility: IMAGING CENTER | Age: 36
End: 2021-12-15

## 2021-12-17 ENCOUNTER — OFFICE VISIT (OUTPATIENT)
Dept: URGENT CARE | Facility: CLINIC | Age: 36
End: 2021-12-17
Payer: COMMERCIAL

## 2021-12-17 ENCOUNTER — APPOINTMENT (OUTPATIENT)
Dept: MEDICAL GROUP | Facility: IMAGING CENTER | Age: 36
End: 2021-12-17
Payer: COMMERCIAL

## 2021-12-17 VITALS
SYSTOLIC BLOOD PRESSURE: 110 MMHG | HEIGHT: 62 IN | BODY MASS INDEX: 24.29 KG/M2 | WEIGHT: 132 LBS | RESPIRATION RATE: 16 BRPM | DIASTOLIC BLOOD PRESSURE: 80 MMHG | TEMPERATURE: 96.8 F | HEART RATE: 80 BPM | OXYGEN SATURATION: 99 %

## 2021-12-17 DIAGNOSIS — R21 RASH/SKIN ERUPTION: ICD-10-CM

## 2021-12-17 DIAGNOSIS — N64.4 BREAST PAIN: ICD-10-CM

## 2021-12-17 PROCEDURE — 99214 OFFICE O/P EST MOD 30 MIN: CPT | Performed by: PHYSICIAN ASSISTANT

## 2021-12-17 RX ORDER — METHYLPREDNISOLONE 4 MG/1
TABLET ORAL
Qty: 21 TABLET | Refills: 0 | Status: SHIPPED | OUTPATIENT
Start: 2021-12-17 | End: 2022-01-13

## 2021-12-17 RX ORDER — HYDROXYZINE HYDROCHLORIDE 25 MG/1
25 TABLET, FILM COATED ORAL 3 TIMES DAILY PRN
Qty: 30 TABLET | Refills: 0 | Status: SHIPPED | OUTPATIENT
Start: 2021-12-17 | End: 2022-01-13

## 2021-12-17 ASSESSMENT — ENCOUNTER SYMPTOMS
EYE REDNESS: 0
EYE DISCHARGE: 0
FEVER: 0
SHORTNESS OF BREATH: 0
NAUSEA: 0
COUGH: 0
VOMITING: 0
HEADACHES: 0
MYALGIAS: 0
BREAST PAIN: 1

## 2021-12-18 NOTE — PROGRESS NOTES
Subjective     Lela Olivas is a 36 y.o. female who presents with Breast Pain (x 1 wk, bilateral breast pain, burning and itching all over body on / off  for over 1 wk)          This is a new problem.  The patient presents to clinic with 2 complaints.    The patient is unsure if the complaints are possibly related.    The patient presents to clinic complaining of intermittent breast pain to the bilateral breasts x months.  The patient states that she has been experiencing an intermittent throbbing, burning/tingling to the breast.  The patient states this sensation lasts seconds prior to resolving.  The patient states on Wednesday (2 days ago) she developed increasing pain to the bilateral breasts lasting approximately 10 minutes.  The patient states the burning/tingling sensation to her breasts is now resolved.  The patient reports continued soreness to her bilateral breasts with possible swelling.  The patient states she also noticed a prominent vein to the proximal aspect of the left breast.  The patient reports no palpable lumps/masses.  No skin changes.  No abnormal nipple discharge.  No fever.  The patient is currently not breast-feeding.  The patient has not taken any OTC medications for her current symptoms.    The patient is also complaining of itching x2 weeks.  The patient states she has been experiencing diffuse itching from head to toe over the past 2 weeks.  The patient states the diffuse itching started after she recently ate red meat.  The patient states she ate red meat approximately 2 weeks ago and developed diffuse itching.  The patient states she took OTC allergy medication with improvement of her symptoms.  The patient states she ate red meat for a second time and developed recurrent itchiness, which also resolved.  The patient states on Monday (5 days ago) she ate red meat for a third time.  The patient states the next day she developed diffuse itchiness.  The patient states she is  experiencing itching and burning from head to toe.  The patient states the symptoms have persisted.  The patient reports no associated rashes/lesions.  She also reports no associated swelling of the face, lips, tongue, or throat.  No difficulty breathing.  No difficulty swallowing.  No fever.  The patient reports no new exposures to soaps, lotions, or detergents, but notes her laundry detergent did have a new scent.  The patient has taken OTC Claritin for her current symptoms as mentioned above.  She has not taken any additional OTC medications.    Breast Pain  Pertinent negatives include no chest pain, congestion, coughing, fever, headaches, myalgias, nausea, rash or vomiting.     PMH:  has a past medical history of Anxiety, Insomnia, Migraine, and No known health problems.  MEDS:   Current Outpatient Medications:   •  Loratadine (CLARITIN PO), Take  by mouth., Disp: , Rfl:   •  benzonatate (TESSALON) 100 MG Cap, Take 1 Capsule by mouth 3 times a day as needed for Cough. (Patient not taking: Reported on 12/17/2021), Disp: 60 Capsule, Rfl: 0  •  albuterol 108 (90 Base) MCG/ACT Aero Soln inhalation aerosol, Inhale 2 Puffs every 6 hours as needed for Shortness of Breath. (Patient not taking: Reported on 12/17/2021), Disp: 8.5 g, Rfl: 0  ALLERGIES:   Allergies   Allergen Reactions   • Meat Extract      Pt eats VEGAN diet, NO MEAT     SURGHX:   Past Surgical History:   Procedure Laterality Date   • OTHER      cyst removed   • OTHER      not sure, soft tissue or skin lesion      SOCHX:  reports that she has never smoked. She has never used smokeless tobacco. She reports that she does not drink alcohol and does not use drugs.  FH: Family history was reviewed, no pertinent findings to report      Review of Systems   Constitutional: Negative for fever.   HENT: Negative for congestion.    Eyes: Negative for discharge and redness.   Respiratory: Negative for cough and shortness of breath.    Cardiovascular: Negative for chest  "pain.   Gastrointestinal: Negative for nausea and vomiting.   Musculoskeletal: Negative for myalgias.   Skin: Positive for itching. Negative for rash.   Neurological: Negative for headaches.              Objective     /80 (BP Location: Left arm, Patient Position: Sitting, BP Cuff Size: Adult)   Pulse 80   Temp 36 °C (96.8 °F) (Temporal)   Resp 16   Ht 1.575 m (5' 2\")   Wt 59.9 kg (132 lb)   SpO2 99%   BMI 24.14 kg/m²      Physical Exam  Constitutional:       General: She is not in acute distress.     Appearance: Normal appearance. She is not ill-appearing.   HENT:      Head: Normocephalic and atraumatic.      Right Ear: External ear normal.      Left Ear: External ear normal.      Nose: Nose normal.      Mouth/Throat:      Mouth: Mucous membranes are moist. No angioedema.      Pharynx: Oropharynx is clear. No posterior oropharyngeal erythema.   Eyes:      Extraocular Movements: Extraocular movements intact.      Conjunctiva/sclera: Conjunctivae normal.   Cardiovascular:      Rate and Rhythm: Normal rate and regular rhythm.      Heart sounds: Normal heart sounds.   Pulmonary:      Effort: Pulmonary effort is normal. No respiratory distress.      Breath sounds: Normal breath sounds. No wheezing.   Chest:   Breasts:      Right: Tenderness present. No swelling, mass, nipple discharge or skin change.      Left: Tenderness present. No swelling, mass, nipple discharge or skin change.        Comments:   The patient had mild soreness to the bilateral breasts with palpation.  No palpable lumps/masses were noted.  No swelling.  No overlying erythema.  No increased warmth.  No skin changes.  No abnormal nipple discharge.  Musculoskeletal:         General: Normal range of motion.      Cervical back: Normal range of motion and neck supple.   Skin:     General: Skin is warm and dry.      Findings: No rash.      Comments:   The patient has scattered faint red blotches to the skin.  No rashes/lesions.  No vesicles.  No " pustules.  No urticaria.  No tenderness to palpation.  No swelling.  No surrounding erythema.  No increased warmth.  No discharge/weeping.  No crusting.  The patient has diffuse excoriations to her back secondary to itching without rashes/lesions or skin changes.   Neurological:      Mental Status: She is alert and oriented to person, place, and time.                             Assessment & Plan          1. Breast pain  - MA-SCREENING MAMMO BILAT W/TOMOSYNTHESIS W/CAD; Future  - US-BREAST BILAT-COMPLETE; Future    The patient's presenting symptoms of sickle exam findings are consistent with breast pain.  The patient is experiencing soreness to the bilateral breasts.  On physical exam, the patient had mild soreness to the bilateral breast with palpation without palpable lumps/masses, swelling, overlying erythema, increased warmth, or skin changes.  No abnormal nipple discharge was appreciated.  The cause of the patient's breast soreness is unknown at this time.  Based on the patient's presenting symptoms and physical exam findings, I have low clinical suspicion for an acute infection.  Will order a mammogram and breast ultrasound to further evaluate the patient's current symptoms.  Will call the patient with results of her imaging studies, and will treat accordingly.  Advised the patient to monitor worsening signs and or symptoms.  Recommend the patient follow-up with her PCP.  Discussed return precautions with the patient, and she verbalized understanding.    Differential diagnoses, supportive care, and indications for immediate follow-up discussed with patient.   Instructed to return to clinic or nearest emergency department for any change in condition, further concerns, or worsening of symptoms.    OTC Tylenol or Motrin for fever/discomfort.  Monitor for worsening signs and or symptoms  Follow-up with PCP  Return to clinic or go to the ED if symptoms worsen or fail to improve, or if patient should develop  worsening/increasing/persistent breast pain, breast swelling, increased redness or warmth, skin changes, nipple discharge, palpable lumps/masses, fever/chills, and/or any concerning symptoms.      2. Rash/skin eruption  - methylPREDNISolone (MEDROL DOSEPAK) 4 MG Tablet Therapy Pack; Follow schedule on package instructions.  Dispense: 21 Tablet; Refill: 0  - hydrOXYzine HCl (ATARAX) 25 MG Tab; Take 1 Tablet by mouth 3 times a day as needed for Itching.  Dispense: 30 Tablet; Refill: 0    The patient also presents to clinic complaining of diffuse itchiness.  The patient states she developed itchiness from head to toe after eating red meat.  On physical exam, the patient had scattered faint red blotches to the skin without rashes/lesions, vesicles, pustules, urticaria, tenderness to palpation, swelling, surrounding erythema, increased warmth, discharge/weeping, or crusting.  The patient had diffuse excoriations to her back secondary to itching.  The patient had no signs of angioedema.  The patient's lungs were clear to auscultation without wheezing, and her pulse ox was within normal limits.  The cause of the patient's diffuse itching is unknown at this time.  The patient correlates her current symptoms to eating red meat.  Advised the patient to avoid red meat at this time.  Recommend the patient switch to free and gentle detergents and lotions without dyes or added since.  The patient verbalized understanding.  Will prescribe the patient a Medrol Dosepak for her current symptoms.  We will also prescribe the patient hydroxyzine for symptomatic relief of her itchiness.  Advised the patient to monitor for worsening signs and or symptoms.  Recommend OTC medications and supportive care for symptomatic management.  Recommend patient follow-up with her PCP as needed.  Discussed return precautions with the patient, and she verbalized.    Differential diagnoses, supportive care, and indications for immediate follow-up  discussed with patient.   Instructed to return to clinic or nearest emergency department for any change in condition, further concerns, or worsening of symptoms.    OTC Tylenol or Motrin for fever/discomfort.  OTC antihistamines for symptomatic relief  OTC antiitch cream for symptomatic relief  Monitor worsening signs and or symptoms  Follow-up with PCP  Return to clinic or go to the ED if symptoms worsen or fail to improve, or if patient should develop worsening/increasing/persistent itchiness, rashes/lesions, pain/tenderness, swelling, increased redness or warmth, discharge/weeping, fever/chills, secondary signs of infection, and/or any concerning symptoms.    Discussed plan with the patient, and she agrees to the above.     I personally reviewed prior external notes and test results pertinent to today's visit.  I have independently reviewed and interpreted all diagnostics ordered during this urgent care visit.     Time spent evaluating this patient was at least 30 minutes and includes preparing for visit, obtaining history, exam and evaluation, ordering labs/tests/procedures/medications, independent interpretation, and counseling/education.    Please note that this dictation was created using voice recognition software. I have made every reasonable attempt to correct obvious errors, but I expect that there may be errors of grammar and possibly content that I did not discover before finalizing the note.     This note was electronically signed by Natalia Diaz PA-C

## 2021-12-22 ENCOUNTER — OFFICE VISIT (OUTPATIENT)
Dept: MEDICAL GROUP | Facility: PHYSICIAN GROUP | Age: 36
End: 2021-12-22
Payer: COMMERCIAL

## 2021-12-22 VITALS
HEIGHT: 62 IN | TEMPERATURE: 98.5 F | HEART RATE: 68 BPM | DIASTOLIC BLOOD PRESSURE: 74 MMHG | BODY MASS INDEX: 25.14 KG/M2 | WEIGHT: 136.6 LBS | RESPIRATION RATE: 16 BRPM | OXYGEN SATURATION: 98 % | SYSTOLIC BLOOD PRESSURE: 106 MMHG

## 2021-12-22 DIAGNOSIS — Z13.220 ENCOUNTER FOR SCREENING FOR LIPID DISORDER: ICD-10-CM

## 2021-12-22 DIAGNOSIS — Z76.89 ENCOUNTER TO ESTABLISH CARE: ICD-10-CM

## 2021-12-22 DIAGNOSIS — Z13.0 SCREENING FOR DEFICIENCY ANEMIA: ICD-10-CM

## 2021-12-22 DIAGNOSIS — Z23 NEED FOR VACCINATION: ICD-10-CM

## 2021-12-22 DIAGNOSIS — J30.89 ENVIRONMENTAL AND SEASONAL ALLERGIES: ICD-10-CM

## 2021-12-22 DIAGNOSIS — Z13.1 ENCOUNTER FOR SCREENING FOR DIABETES MELLITUS: ICD-10-CM

## 2021-12-22 PROBLEM — R52 PAIN RELATED TO VAGINAL DELIVERY: Status: RESOLVED | Noted: 2019-07-18 | Resolved: 2021-12-22

## 2021-12-22 PROBLEM — K64.0 GRADE I HEMORRHOIDS: Status: RESOLVED | Noted: 2018-09-25 | Resolved: 2021-12-22

## 2021-12-22 PROCEDURE — 90686 IIV4 VACC NO PRSV 0.5 ML IM: CPT | Performed by: NURSE PRACTITIONER

## 2021-12-22 PROCEDURE — 90471 IMMUNIZATION ADMIN: CPT | Performed by: NURSE PRACTITIONER

## 2021-12-22 PROCEDURE — 99213 OFFICE O/P EST LOW 20 MIN: CPT | Mod: 25 | Performed by: NURSE PRACTITIONER

## 2021-12-22 SDOH — ECONOMIC STABILITY: FOOD INSECURITY: WITHIN THE PAST 12 MONTHS, THE FOOD YOU BOUGHT JUST DIDN'T LAST AND YOU DIDN'T HAVE MONEY TO GET MORE.: NEVER TRUE

## 2021-12-22 SDOH — ECONOMIC STABILITY: INCOME INSECURITY: HOW HARD IS IT FOR YOU TO PAY FOR THE VERY BASICS LIKE FOOD, HOUSING, MEDICAL CARE, AND HEATING?: SOMEWHAT HARD

## 2021-12-22 SDOH — HEALTH STABILITY: PHYSICAL HEALTH: ON AVERAGE, HOW MANY MINUTES DO YOU ENGAGE IN EXERCISE AT THIS LEVEL?: 60 MIN

## 2021-12-22 SDOH — ECONOMIC STABILITY: HOUSING INSECURITY: IN THE LAST 12 MONTHS, HOW MANY PLACES HAVE YOU LIVED?: 1

## 2021-12-22 SDOH — ECONOMIC STABILITY: TRANSPORTATION INSECURITY
IN THE PAST 12 MONTHS, HAS LACK OF TRANSPORTATION KEPT YOU FROM MEETINGS, WORK, OR FROM GETTING THINGS NEEDED FOR DAILY LIVING?: NO

## 2021-12-22 SDOH — ECONOMIC STABILITY: TRANSPORTATION INSECURITY
IN THE PAST 12 MONTHS, HAS THE LACK OF TRANSPORTATION KEPT YOU FROM MEDICAL APPOINTMENTS OR FROM GETTING MEDICATIONS?: NO

## 2021-12-22 SDOH — HEALTH STABILITY: MENTAL HEALTH
STRESS IS WHEN SOMEONE FEELS TENSE, NERVOUS, ANXIOUS, OR CAN'T SLEEP AT NIGHT BECAUSE THEIR MIND IS TROUBLED. HOW STRESSED ARE YOU?: NOT AT ALL

## 2021-12-22 SDOH — ECONOMIC STABILITY: HOUSING INSECURITY
IN THE LAST 12 MONTHS, WAS THERE A TIME WHEN YOU DID NOT HAVE A STEADY PLACE TO SLEEP OR SLEPT IN A SHELTER (INCLUDING NOW)?: YES

## 2021-12-22 SDOH — ECONOMIC STABILITY: INCOME INSECURITY: IN THE LAST 12 MONTHS, WAS THERE A TIME WHEN YOU WERE NOT ABLE TO PAY THE MORTGAGE OR RENT ON TIME?: NO

## 2021-12-22 SDOH — ECONOMIC STABILITY: FOOD INSECURITY: WITHIN THE PAST 12 MONTHS, YOU WORRIED THAT YOUR FOOD WOULD RUN OUT BEFORE YOU GOT MONEY TO BUY MORE.: NEVER TRUE

## 2021-12-22 SDOH — HEALTH STABILITY: PHYSICAL HEALTH: ON AVERAGE, HOW MANY DAYS PER WEEK DO YOU ENGAGE IN MODERATE TO STRENUOUS EXERCISE (LIKE A BRISK WALK)?: 3 DAYS

## 2021-12-22 SDOH — ECONOMIC STABILITY: TRANSPORTATION INSECURITY
IN THE PAST 12 MONTHS, HAS LACK OF RELIABLE TRANSPORTATION KEPT YOU FROM MEDICAL APPOINTMENTS, MEETINGS, WORK OR FROM GETTING THINGS NEEDED FOR DAILY LIVING?: NO

## 2021-12-22 SDOH — ECONOMIC STABILITY: HOUSING INSECURITY
IN THE LAST 12 MONTHS, WAS THERE A TIME WHEN YOU DID NOT HAVE A STEADY PLACE TO SLEEP OR SLEPT IN A SHELTER (INCLUDING NOW)?: NO

## 2021-12-22 ASSESSMENT — PATIENT HEALTH QUESTIONNAIRE - PHQ9: CLINICAL INTERPRETATION OF PHQ2 SCORE: 0

## 2021-12-22 ASSESSMENT — SOCIAL DETERMINANTS OF HEALTH (SDOH)
HOW MANY DRINKS CONTAINING ALCOHOL DO YOU HAVE ON A TYPICAL DAY WHEN YOU ARE DRINKING: PATIENT DECLINED
DO YOU BELONG TO ANY CLUBS OR ORGANIZATIONS SUCH AS CHURCH GROUPS UNIONS, FRATERNAL OR ATHLETIC GROUPS, OR SCHOOL GROUPS?: NO
IN A TYPICAL WEEK, HOW MANY TIMES DO YOU TALK ON THE PHONE WITH FAMILY, FRIENDS, OR NEIGHBORS?: MORE THAN THREE TIMES A WEEK
HOW OFTEN DO YOU HAVE SIX OR MORE DRINKS ON ONE OCCASION: NEVER
HOW HARD IS IT FOR YOU TO PAY FOR THE VERY BASICS LIKE FOOD, HOUSING, MEDICAL CARE, AND HEATING?: SOMEWHAT HARD
DO YOU BELONG TO ANY CLUBS OR ORGANIZATIONS SUCH AS CHURCH GROUPS UNIONS, FRATERNAL OR ATHLETIC GROUPS, OR SCHOOL GROUPS?: NO
HOW OFTEN DO YOU GET TOGETHER WITH FRIENDS OR RELATIVES?: ONCE A WEEK
HOW OFTEN DO YOU ATTEND CHURCH OR RELIGIOUS SERVICES?: PATIENT DECLINED
HOW OFTEN DO YOU GET TOGETHER WITH FRIENDS OR RELATIVES?: ONCE A WEEK
HOW OFTEN DO YOU ATTENT MEETINGS OF THE CLUB OR ORGANIZATION YOU BELONG TO?: NEVER
IN A TYPICAL WEEK, HOW MANY TIMES DO YOU TALK ON THE PHONE WITH FAMILY, FRIENDS, OR NEIGHBORS?: MORE THAN THREE TIMES A WEEK
HOW OFTEN DO YOU ATTENT MEETINGS OF THE CLUB OR ORGANIZATION YOU BELONG TO?: NEVER
HOW OFTEN DO YOU HAVE A DRINK CONTAINING ALCOHOL: NEVER
WITHIN THE PAST 12 MONTHS, YOU WORRIED THAT YOUR FOOD WOULD RUN OUT BEFORE YOU GOT THE MONEY TO BUY MORE: NEVER TRUE
HOW OFTEN DO YOU ATTEND CHURCH OR RELIGIOUS SERVICES?: PATIENT DECLINED

## 2021-12-22 ASSESSMENT — LIFESTYLE VARIABLES
HOW OFTEN DO YOU HAVE A DRINK CONTAINING ALCOHOL: NEVER
HOW MANY STANDARD DRINKS CONTAINING ALCOHOL DO YOU HAVE ON A TYPICAL DAY: PATIENT DECLINED
HOW OFTEN DO YOU HAVE SIX OR MORE DRINKS ON ONE OCCASION: NEVER

## 2021-12-23 ENCOUNTER — HOSPITAL ENCOUNTER (OUTPATIENT)
Dept: LAB | Facility: MEDICAL CENTER | Age: 36
End: 2021-12-23
Attending: NURSE PRACTITIONER
Payer: COMMERCIAL

## 2021-12-23 DIAGNOSIS — Z13.220 ENCOUNTER FOR SCREENING FOR LIPID DISORDER: ICD-10-CM

## 2021-12-23 DIAGNOSIS — Z13.0 SCREENING FOR DEFICIENCY ANEMIA: ICD-10-CM

## 2021-12-23 DIAGNOSIS — Z13.1 ENCOUNTER FOR SCREENING FOR DIABETES MELLITUS: ICD-10-CM

## 2021-12-23 LAB
ALBUMIN SERPL BCP-MCNC: 4.6 G/DL (ref 3.2–4.9)
ALBUMIN/GLOB SERPL: 1.6 G/DL
ALP SERPL-CCNC: 91 U/L (ref 30–99)
ALT SERPL-CCNC: 16 U/L (ref 2–50)
ANION GAP SERPL CALC-SCNC: 9 MMOL/L (ref 7–16)
AST SERPL-CCNC: 15 U/L (ref 12–45)
BILIRUB SERPL-MCNC: 0.2 MG/DL (ref 0.1–1.5)
BUN SERPL-MCNC: 13 MG/DL (ref 8–22)
CALCIUM SERPL-MCNC: 8.9 MG/DL (ref 8.5–10.5)
CHLORIDE SERPL-SCNC: 106 MMOL/L (ref 96–112)
CHOLEST SERPL-MCNC: 155 MG/DL (ref 100–199)
CO2 SERPL-SCNC: 25 MMOL/L (ref 20–33)
CREAT SERPL-MCNC: 0.62 MG/DL (ref 0.5–1.4)
ERYTHROCYTE [DISTWIDTH] IN BLOOD BY AUTOMATED COUNT: 43 FL (ref 35.9–50)
FASTING STATUS PATIENT QL REPORTED: NORMAL
GLOBULIN SER CALC-MCNC: 2.8 G/DL (ref 1.9–3.5)
GLUCOSE SERPL-MCNC: 116 MG/DL (ref 65–99)
HCT VFR BLD AUTO: 42.9 % (ref 37–47)
HDLC SERPL-MCNC: 50 MG/DL
HGB BLD-MCNC: 13.9 G/DL (ref 12–16)
LDLC SERPL CALC-MCNC: 89 MG/DL
MCH RBC QN AUTO: 28.9 PG (ref 27–33)
MCHC RBC AUTO-ENTMCNC: 32.4 G/DL (ref 33.6–35)
MCV RBC AUTO: 89.2 FL (ref 81.4–97.8)
PLATELET # BLD AUTO: 288 K/UL (ref 164–446)
PMV BLD AUTO: 9.7 FL (ref 9–12.9)
POTASSIUM SERPL-SCNC: 4.2 MMOL/L (ref 3.6–5.5)
PROT SERPL-MCNC: 7.4 G/DL (ref 6–8.2)
RBC # BLD AUTO: 4.81 M/UL (ref 4.2–5.4)
SODIUM SERPL-SCNC: 140 MMOL/L (ref 135–145)
TRIGL SERPL-MCNC: 81 MG/DL (ref 0–149)
WBC # BLD AUTO: 10.1 K/UL (ref 4.8–10.8)

## 2021-12-23 PROCEDURE — 85027 COMPLETE CBC AUTOMATED: CPT

## 2021-12-23 PROCEDURE — 80061 LIPID PANEL: CPT

## 2021-12-23 PROCEDURE — 80053 COMPREHEN METABOLIC PANEL: CPT

## 2021-12-23 PROCEDURE — 36415 COLL VENOUS BLD VENIPUNCTURE: CPT

## 2021-12-23 NOTE — ASSESSMENT & PLAN NOTE
Chronic and ongoing. Currently taking OTC Claritin. She states that it does help manage her allergies. Denies any problem currently with her allergies.

## 2021-12-24 NOTE — PROGRESS NOTES
Subjective  Chief Complaint  Establish care to manage her chronic conditions    History of Present Illness  Lela Olivas is a 36 y.o. female. This patient is here today to establish care.    Encounter to establish care  Lela is here today to establish care with a new primary care provider.    Environmental and seasonal allergies  Chronic and ongoing. Currently taking OTC Claritin. She states that it does help manage her allergies. Denies any problem currently with her allergies.      Past Medical History    Allergies: Meat extract  Past Medical History:   Diagnosis Date   • Anxiety    • Grade I hemorrhoids 2018   • Insomnia    • Migraine    • No known health problems    • Pain related to vaginal delivery 2019   •  (spontaneous vaginal delivery) 2019     Past Surgical History:   Procedure Laterality Date   • OTHER      cyst removed   • OTHER      not sure, soft tissue or skin lesion      Current Outpatient Medications Ordered in Epic   Medication Sig Dispense Refill   • Loratadine (CLARITIN PO) Take  by mouth.     • methylPREDNISolone (MEDROL DOSEPAK) 4 MG Tablet Therapy Pack Follow schedule on package instructions. 21 Tablet 0   • hydrOXYzine HCl (ATARAX) 25 MG Tab Take 1 Tablet by mouth 3 times a day as needed for Itching. 30 Tablet 0   • albuterol 108 (90 Base) MCG/ACT Aero Soln inhalation aerosol Inhale 2 Puffs every 6 hours as needed for Shortness of Breath. 8.5 g 0     No current Epic-ordered facility-administered medications on file.     Family History:    Family History   Problem Relation Age of Onset   • Psychiatric Illness Mother         depression   • Diabetes Father    • Cancer Maternal Uncle         lung cancer, smoker   • Cancer Paternal Grandfather         brain cancer   • Heart Disease Maternal Uncle         MI   • Stroke Neg Hx    • Alcohol/Drug Neg Hx       Personal/Social History:    Social History     Tobacco Use   • Smoking status: Never Smoker   • Smokeless tobacco:  "Never Used   Vaping Use   • Vaping Use: Never used   Substance Use Topics   • Alcohol use: No   • Drug use: No     Social History     Social History Narrative   • Not on file      Review of Systems:     General: Negative for fever/chills and unexpected weight change.    Eyes:  Negative for vision changes, eye pain.   Respiratory:  Negative for cough and dyspnea.     Cardiovascular:  Negative for chest pain and palpitations.    Musculoskeletal:  Negative for myalgias.    Neurological:  Negative for numbness/tingling and headaches.     Objective  Physical Exam:   /74   Pulse 68   Temp 36.9 °C (98.5 °F)   Resp 16   Ht 1.575 m (5' 2\")   Wt 62 kg (136 lb 9.6 oz)   SpO2 98%  Body mass index is 24.98 kg/m².  General:  Alert and oriented.  Well appearing.  NAD.  Head:  Normocephalic.    Neck: Supple without JVD. No lymphadenopathy.  Pulmonary:  Normal effort.  Clear to ausculation without rales, ronchi, or wheezing.  Cardiovascular:  Regular rate and rhythm without murmur, rubs or gallop.  Radial pulses are intact and equal bilaterally.  Musculoskeletal:  No extremity cyanosis, clubbing, or edema.  Psych: Normal mood and affect. Alert and oriented x3. Judgment and insight is normal.      Assessment/Plan   1. Encounter to establish care  [facility] is here to establish care with a new primary care provider.    2. Environmental and seasonal allergies  Chronic and ongoing.  Continue to take OTC Allergy medications.    3. Need for vaccination  - INFLUENZA VACCINE QUAD INJ (PF)    4. Encounter for screening for diabetes mellitus  Due for updated labs.  - Comp Metabolic Panel; Future    5. Encounter for screening for lipid disorder  Due for updated labs.  - Lipid Profile; Future    6. Screening for deficiency anemia  Due for updated labs.  - CBC WITHOUT DIFFERENTIAL; Future        Return if symptoms worsen or fail to improve.    I have placed the above orders and discussed them with an approved delegating provider.  The " MA is performing the below orders under the direction of Dr. Bart Ramirez MD/DO.     Please note that this dictation was created using voice recognition software. I have made every reasonable attempt to correct obvious errors, but I expect that there are errors of grammar and possibly content that I did not discover before finalizing the note.    VENICE Coughlin  [facility]

## 2021-12-29 ENCOUNTER — HOSPITAL ENCOUNTER (OUTPATIENT)
Dept: RADIOLOGY | Facility: MEDICAL CENTER | Age: 36
End: 2021-12-29
Attending: PHYSICIAN ASSISTANT
Payer: COMMERCIAL

## 2021-12-29 DIAGNOSIS — N64.4 BREAST PAIN: ICD-10-CM

## 2021-12-29 PROCEDURE — G0279 TOMOSYNTHESIS, MAMMO: HCPCS

## 2022-01-13 ENCOUNTER — HOSPITAL ENCOUNTER (OUTPATIENT)
Facility: MEDICAL CENTER | Age: 37
End: 2022-01-13
Attending: PHYSICIAN ASSISTANT
Payer: COMMERCIAL

## 2022-01-13 ENCOUNTER — OFFICE VISIT (OUTPATIENT)
Dept: URGENT CARE | Facility: PHYSICIAN GROUP | Age: 37
End: 2022-01-13
Payer: COMMERCIAL

## 2022-01-13 VITALS
BODY MASS INDEX: 23.92 KG/M2 | TEMPERATURE: 97.9 F | HEIGHT: 63 IN | HEART RATE: 82 BPM | DIASTOLIC BLOOD PRESSURE: 68 MMHG | OXYGEN SATURATION: 98 % | SYSTOLIC BLOOD PRESSURE: 120 MMHG | WEIGHT: 135 LBS | RESPIRATION RATE: 18 BRPM

## 2022-01-13 DIAGNOSIS — Z20.822 SUSPECTED COVID-19 VIRUS INFECTION: ICD-10-CM

## 2022-01-13 PROCEDURE — 99213 OFFICE O/P EST LOW 20 MIN: CPT | Mod: CS | Performed by: PHYSICIAN ASSISTANT

## 2022-01-13 PROCEDURE — U0005 INFEC AGEN DETEC AMPLI PROBE: HCPCS

## 2022-01-13 PROCEDURE — U0003 INFECTIOUS AGENT DETECTION BY NUCLEIC ACID (DNA OR RNA); SEVERE ACUTE RESPIRATORY SYNDROME CORONAVIRUS 2 (SARS-COV-2) (CORONAVIRUS DISEASE [COVID-19]), AMPLIFIED PROBE TECHNIQUE, MAKING USE OF HIGH THROUGHPUT TECHNOLOGIES AS DESCRIBED BY CMS-2020-01-R: HCPCS

## 2022-01-13 ASSESSMENT — ENCOUNTER SYMPTOMS
SORE THROAT: 1
ABDOMINAL PAIN: 0
CHILLS: 0
CONSTIPATION: 0
DIARRHEA: 0
VOMITING: 0
HEADACHES: 0
EYE PAIN: 0
SHORTNESS OF BREATH: 0
COUGH: 1
MYALGIAS: 1
FEVER: 0
NAUSEA: 0

## 2022-01-13 ASSESSMENT — FIBROSIS 4 INDEX: FIB4 SCORE: 0.44

## 2022-01-13 NOTE — LETTER
January 13, 2022    To Whom It May Concern:         This is confirmation that Lela Olivas attended her scheduled appointment with Oscar Newman P.A.-C. on 1/13/22.  Your employee was seen in our clinic today.  A concern for COVID-19 has been identified and testing is in progress. We are asking you to excuse absences while following self-isolation protocol per Center for Disease Control (CDC) guidelines.  Your employee will be able to access test results through our electronic delivery system called [employer].     If the results of testing are positive, your employee should follow the CDC guidelines.  These are isolation for a minimum of 5 days and at least 24 hours have passed since your last fever without the use of fever-reducing medications and all other symptoms have improved.  After completing the isolation the patient must continue to use a well fitting mask for an additional 5 days.  The health department may contact you and provide further directions regarding self-isolation and return to work.     If the results of testing are negative, and once there has been no fever (tem >100.4) for at least 48 hours without treatment, and no vomiting or diarrhea for at least 48 hours, then return to work is approved.    This is the only note that will be provided from [facility] for this visit.  Your employee will require an appointment with a primary care provider if FMLA or disability forms are required.  If you have any questions please do not hesitate to call me at the phone number listed below.    Sincerely,    Oscar Newman P.A.-C.  [phone]  (signed electronically)

## 2022-01-14 DIAGNOSIS — Z20.822 SUSPECTED COVID-19 VIRUS INFECTION: ICD-10-CM

## 2022-01-14 LAB — COVID ORDER STATUS COVID19: NORMAL

## 2022-01-14 NOTE — PROGRESS NOTES
"Subjective:   Lela Olivas is a 36 y.o. female who presents for Sore Throat (cough, fever, right foot injury )      36-year-old female presents urgent care with 2 separate complaints.  The first of which involved 2 days of sore throat, cough, mild runny nose, tactile fever and malaise.  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  Apparently the children were exposed to COVID-positive individuals at school.  This patient is vaccine against COVID and denies any dyspnea.  Secondly the patient notes that she was putting away some kitchen knives today and one of the knives fell striking her right foot causing a small abrasion/laceration and wants this evaluated to see whether it is amenable to sutures.  Last tetanus was around 2 to 3 years ago.  She denies any numbness or tingling, no limitation in range of motion      Review of Systems   Constitutional: Positive for malaise/fatigue. Negative for chills and fever.   HENT: Positive for congestion and sore throat. Negative for ear pain.    Eyes: Negative for pain.   Respiratory: Positive for cough. Negative for shortness of breath.    Cardiovascular: Negative for chest pain.   Gastrointestinal: Negative for abdominal pain, constipation, diarrhea, nausea and vomiting.   Genitourinary: Negative for dysuria.   Musculoskeletal: Positive for myalgias.   Skin: Negative for rash.   Neurological: Negative for headaches.       Medications, Allergies, and current problem list reviewed today in Epic.     Objective:     /68   Pulse 82   Temp 36.6 °C (97.9 °F)   Resp 18   Ht 1.6 m (5' 3\")   Wt 61.2 kg (135 lb)   SpO2 98%     Physical Exam  Vitals reviewed.   Constitutional:       Appearance: Normal appearance. She is not ill-appearing or toxic-appearing.   HENT:      Head: Normocephalic and atraumatic.      Right Ear: External ear normal.      Left Ear: External ear normal.      Nose: Rhinorrhea present.      Mouth/Throat:      Mouth: Mucous membranes are moist. " "     Pharynx: No oropharyngeal exudate or posterior oropharyngeal erythema.      Comments: POST NASAL DRIP  Eyes:      Conjunctiva/sclera: Conjunctivae normal.   Cardiovascular:      Rate and Rhythm: Normal rate.   Pulmonary:      Effort: Pulmonary effort is normal.      Breath sounds: Normal breath sounds.   Musculoskeletal:      Cervical back: Normal range of motion.   Lymphadenopathy:      Cervical: No cervical adenopathy.   Skin:     General: Skin is warm and dry.      Capillary Refill: Capillary refill takes less than 2 seconds.      Comments: Dorsum of the right foot just proximal to the fourth toe there is a 1 cm superficial abrasion, no deeper structures involved, no ongoing bleeding.  Range of motion of affected toes intact.  No surrounding ecchymosis or edema   Neurological:      Mental Status: She is alert and oriented to person, place, and time.         Assessment/Plan:     Diagnosis and associated orders:     1. Suspected COVID-19 virus infection  COVID/SARS CoV-2 PCR      Comments/MDM:     • For the wound and I discussed wound management, indications of infection.  The wound is not amenable to sutures at this time.  Return if signs of infection develop.  Tetanus is up-to-date and this is a pretty low risk injury.  There does not appear to be any deeper structural involvement and the wound is not contaminated.  Patient's vital signs are reassuring and they appear hemodynamically stable and do not require higher level care at this time  I discussed self isolation and provided printed instructions (if applicable)  I discussed ER precautions and provided printed instructions (if applicable)  I educated the patient on possibility of a false-negative test and indications for repeat testing  I instructed the patient to try to follow up with their PCP (if applicable) for follow up care  I discussed the possibly of \"breakthrough infections\" in fully vaccinated people  The patient will receive results via " "MyChart (\"detected\" is a positive result, \"not detected\" indicates a negative result) or in clinic with rapid test.  If requested, I provided the patient with a work note to provide to their employer or school regarding returning to work and discontinuation of self isolation.  All questions were answered and patient demonstrated verbal understanding of above.  I followed all reasonable PPE precautions during this encounter including but not limited to use of an N95 mask, gloves, and gown if indicated.             Differential diagnosis, natural history, supportive care, and indications for immediate follow-up discussed.    Advised the patient to follow-up with the primary care physician for recheck, reevaluation, and consideration of further management.    Please note that this dictation was created using voice recognition software. I have made a reasonable attempt to correct obvious errors, but I expect that there are errors of grammar and possibly content that I did not discover before finalizing the note.    This note was electronically signed by Oscar Newman PA-C  "

## 2022-01-15 LAB
SARS-COV-2 RNA RESP QL NAA+PROBE: DETECTED
SPECIMEN SOURCE: ABNORMAL

## 2022-03-03 ENCOUNTER — OFFICE VISIT (OUTPATIENT)
Dept: URGENT CARE | Facility: CLINIC | Age: 37
End: 2022-03-03
Payer: COMMERCIAL

## 2022-03-03 VITALS
HEART RATE: 73 BPM | BODY MASS INDEX: 24.66 KG/M2 | SYSTOLIC BLOOD PRESSURE: 104 MMHG | RESPIRATION RATE: 14 BRPM | TEMPERATURE: 98.8 F | HEIGHT: 62 IN | OXYGEN SATURATION: 96 % | DIASTOLIC BLOOD PRESSURE: 70 MMHG | WEIGHT: 134 LBS

## 2022-03-03 DIAGNOSIS — J20.9 ACUTE BRONCHITIS WITH BRONCHOSPASM: ICD-10-CM

## 2022-03-03 DIAGNOSIS — Z86.16 HISTORY OF COVID-19: ICD-10-CM

## 2022-03-03 DIAGNOSIS — R11.10 POST-TUSSIVE EMESIS: ICD-10-CM

## 2022-03-03 DIAGNOSIS — J01.00 ACUTE MAXILLARY SINUSITIS, RECURRENCE NOT SPECIFIED: Primary | ICD-10-CM

## 2022-03-03 DIAGNOSIS — K64.9 HEMORRHOIDS, UNSPECIFIED HEMORRHOID TYPE: ICD-10-CM

## 2022-03-03 PROCEDURE — 99213 OFFICE O/P EST LOW 20 MIN: CPT | Performed by: PHYSICIAN ASSISTANT

## 2022-03-03 RX ORDER — AMOXICILLIN AND CLAVULANATE POTASSIUM 875; 125 MG/1; MG/1
1 TABLET, FILM COATED ORAL 2 TIMES DAILY
Qty: 20 TABLET | Refills: 0 | Status: SHIPPED | OUTPATIENT
Start: 2022-03-03 | End: 2022-05-19

## 2022-03-03 RX ORDER — DEXTROMETHORPHAN HYDROBROMIDE AND PROMETHAZINE HYDROCHLORIDE 15; 6.25 MG/5ML; MG/5ML
5 SYRUP ORAL 4 TIMES DAILY PRN
Qty: 180 ML | Refills: 0 | Status: SHIPPED | OUTPATIENT
Start: 2022-03-03 | End: 2022-03-13

## 2022-03-03 RX ORDER — ALBUTEROL SULFATE 90 UG/1
2 AEROSOL, METERED RESPIRATORY (INHALATION) EVERY 6 HOURS PRN
Qty: 8.5 G | Refills: 0 | Status: SHIPPED | OUTPATIENT
Start: 2022-03-03 | End: 2022-05-19 | Stop reason: SDUPTHER

## 2022-03-03 ASSESSMENT — ENCOUNTER SYMPTOMS: COUGH: 1

## 2022-03-03 ASSESSMENT — FIBROSIS 4 INDEX: FIB4 SCORE: 0.44

## 2022-03-03 NOTE — PROGRESS NOTES
Subjective     Lela Olivas is a 36 y.o. female who presents with Cough (3x weeks, trying over the counter med/ home remedies not working, nasal congestion) and Hemorrhoids (Began Monday)    Medications: None     Allergies: Meat extract    Problem List: Lela Olivas does not have any pertinent problems on file.    Surgical History:  Past Surgical History:   Procedure Laterality Date   • OTHER      cyst removed   • OTHER      not sure, soft tissue or skin lesion        Past Social Hx: Lela Olivas  reports that she has never smoked. She has never used smokeless tobacco. She reports that she does not drink alcohol and does not use drugs.     Past Family Hx:  Lela Olivas family history includes Cancer in her maternal uncle and paternal grandfather; Diabetes in her father; Heart Disease in her maternal uncle; Psychiatric Illness in her mother.     Problem list, medications, and allergies reviewed by myself today in Epic.            Patient presents with:  Cough: 3x weeks, trying over the counter med/ home remedies not working, nasal congestion.  Pt had covid about 6 weeks ago, symptoms had mostly resolved but then started to worsen again 3 weeks ago.  Pt states she is now coughing so hard she is vomiting.  Pt has tried otc cough cold medicines with no real relief.  PT also having a flare up of hemorrhoids for about 5 days from recent episode of diarrhea.  Pt is using Prep H, wondering if there is something more she should be doing. No active bleeding from hemorrhoids, just tender.         Cough  This is a new problem. The current episode started 1 to 4 weeks ago. The problem has been gradually worsening. The problem occurs every few minutes. The cough is non-productive. Associated symptoms include headaches, nasal congestion, postnasal drip, a sore throat and wheezing. Pertinent negatives include no chills, fever, hemoptysis or shortness of breath. The symptoms are aggravated by lying  "down. She has tried body position changes, OTC cough suppressant, prescription cough suppressant and rest for the symptoms. The treatment provided no relief. There is no history of asthma or pneumonia.       Review of Systems   Constitutional: Negative for chills and fever.   HENT: Positive for congestion, postnasal drip, sinus pain and sore throat.    Respiratory: Positive for cough and wheezing. Negative for hemoptysis, sputum production, shortness of breath and stridor.    Gastrointestinal: Positive for vomiting.   Neurological: Positive for headaches.   All other systems reviewed and are negative.             Objective     /70 (BP Location: Left arm, Patient Position: Sitting, BP Cuff Size: Adult)   Pulse 73   Temp 37.1 °C (98.8 °F) (Temporal)   Resp 14   Ht 1.575 m (5' 2\")   Wt 60.8 kg (134 lb)   SpO2 96%   BMI 24.51 kg/m²      Physical Exam  Vitals and nursing note reviewed.   Constitutional:       General: She is not in acute distress.     Appearance: Normal appearance. She is well-developed and normal weight.   HENT:      Head: Normocephalic and atraumatic.      Right Ear: Tympanic membrane normal.      Left Ear: Tympanic membrane normal.      Nose: Mucosal edema and congestion present.      Right Sinus: Maxillary sinus tenderness present.      Left Sinus: Maxillary sinus tenderness present.      Mouth/Throat:      Lips: Pink.      Mouth: Mucous membranes are moist.      Pharynx: Uvula midline. No oropharyngeal exudate or posterior oropharyngeal erythema.   Eyes:      Extraocular Movements: Extraocular movements intact.      Conjunctiva/sclera: Conjunctivae normal.      Pupils: Pupils are equal, round, and reactive to light.   Cardiovascular:      Rate and Rhythm: Normal rate and regular rhythm.      Pulses: Normal pulses.      Heart sounds: Normal heart sounds.   Pulmonary:      Effort: Pulmonary effort is normal. No respiratory distress.      Breath sounds: No stridor. Wheezing present. No " rhonchi or rales.   Abdominal:      Palpations: Abdomen is soft.   Musculoskeletal:         General: Normal range of motion.      Cervical back: Normal range of motion and neck supple.   Lymphadenopathy:      Cervical: No cervical adenopathy.   Skin:     General: Skin is warm and dry.      Capillary Refill: Capillary refill takes less than 2 seconds.   Neurological:      General: No focal deficit present.      Mental Status: She is alert and oriented to person, place, and time.      Gait: Gait normal.   Psychiatric:         Mood and Affect: Mood normal.                             Assessment & Plan              1. Acute maxillary sinusitis, recurrence not specified  albuterol 108 (90 Base) MCG/ACT Aero Soln inhalation aerosol    promethazine-dextromethorphan (PROMETHAZINE-DM) 6.25-15 MG/5ML syrup    amoxicillin-clavulanate (AUGMENTIN) 875-125 MG Tab   2. Acute bronchitis with bronchospasm  albuterol 108 (90 Base) MCG/ACT Aero Soln inhalation aerosol    promethazine-dextromethorphan (PROMETHAZINE-DM) 6.25-15 MG/5ML syrup    amoxicillin-clavulanate (AUGMENTIN) 875-125 MG Tab   3. Post-tussive emesis  albuterol 108 (90 Base) MCG/ACT Aero Soln inhalation aerosol    promethazine-dextromethorphan (PROMETHAZINE-DM) 6.25-15 MG/5ML syrup    amoxicillin-clavulanate (AUGMENTIN) 875-125 MG Tab   4. History of COVID-19  albuterol 108 (90 Base) MCG/ACT Aero Soln inhalation aerosol    promethazine-dextromethorphan (PROMETHAZINE-DM) 6.25-15 MG/5ML syrup    amoxicillin-clavulanate (AUGMENTIN) 875-125 MG Tab   5. Hemorrhoids, unspecified hemorrhoid type         Patient was evaluated in clinic today while wearing appropriate personal protective equipment.      PT to begin prescription medications today as discussed for sinusitis and bronchospasm.      OTC Prep H is appropriate for patient hemorrhoids.  Can use Tucks pads as well if desired.     PT should follow up with PCP in 1-2 days for re-evaluation if symptoms have not improved.       Discussed red flags and reasons to return to UC or ED.      Pt and/or family verbalized understanding of diagnosis and follow up instructions and was offered informational handout on diagnosis.  PT discharged.

## 2022-03-06 ASSESSMENT — ENCOUNTER SYMPTOMS
HEMOPTYSIS: 0
SHORTNESS OF BREATH: 0
SPUTUM PRODUCTION: 0
WHEEZING: 1
CHILLS: 0
HEADACHES: 1
SORE THROAT: 1
FEVER: 0
VOMITING: 1
STRIDOR: 0
SINUS PAIN: 1

## 2022-03-24 ENCOUNTER — TELEPHONE (OUTPATIENT)
Dept: URGENT CARE | Facility: CLINIC | Age: 37
End: 2022-03-24

## 2022-05-19 ENCOUNTER — HOSPITAL ENCOUNTER (OUTPATIENT)
Dept: RADIOLOGY | Facility: MEDICAL CENTER | Age: 37
End: 2022-05-19
Attending: NURSE PRACTITIONER
Payer: COMMERCIAL

## 2022-05-19 ENCOUNTER — OFFICE VISIT (OUTPATIENT)
Dept: MEDICAL GROUP | Facility: PHYSICIAN GROUP | Age: 37
End: 2022-05-19
Payer: COMMERCIAL

## 2022-05-19 VITALS
SYSTOLIC BLOOD PRESSURE: 100 MMHG | HEART RATE: 64 BPM | TEMPERATURE: 98.5 F | HEIGHT: 62 IN | BODY MASS INDEX: 23.99 KG/M2 | DIASTOLIC BLOOD PRESSURE: 60 MMHG | WEIGHT: 130.38 LBS | RESPIRATION RATE: 20 BRPM | OXYGEN SATURATION: 98 %

## 2022-05-19 DIAGNOSIS — R06.02 SHORTNESS OF BREATH: ICD-10-CM

## 2022-05-19 DIAGNOSIS — J30.89 ENVIRONMENTAL AND SEASONAL ALLERGIES: ICD-10-CM

## 2022-05-19 PROCEDURE — 99214 OFFICE O/P EST MOD 30 MIN: CPT | Performed by: NURSE PRACTITIONER

## 2022-05-19 PROCEDURE — 71046 X-RAY EXAM CHEST 2 VIEWS: CPT

## 2022-05-19 RX ORDER — ALBUTEROL SULFATE 90 UG/1
2 AEROSOL, METERED RESPIRATORY (INHALATION) EVERY 6 HOURS PRN
Qty: 8.5 G | Refills: 2 | Status: SHIPPED | OUTPATIENT
Start: 2022-05-19 | End: 2023-01-11

## 2022-05-19 RX ORDER — MONTELUKAST SODIUM 10 MG/1
10 TABLET ORAL DAILY
Qty: 90 TABLET | Refills: 1 | Status: SHIPPED | OUTPATIENT
Start: 2022-05-19 | End: 2023-04-26 | Stop reason: SDUPTHER

## 2022-05-19 RX ORDER — DEXTROMETHORPHAN HYDROBROMIDE AND PROMETHAZINE HYDROCHLORIDE 15; 6.25 MG/5ML; MG/5ML
5 SYRUP ORAL EVERY 4 HOURS PRN
Qty: 120 ML | Refills: 0 | Status: SHIPPED | OUTPATIENT
Start: 2022-05-19 | End: 2022-08-02 | Stop reason: SDUPTHER

## 2022-05-19 ASSESSMENT — PATIENT HEALTH QUESTIONNAIRE - PHQ9: CLINICAL INTERPRETATION OF PHQ2 SCORE: 0

## 2022-05-19 ASSESSMENT — FIBROSIS 4 INDEX: FIB4 SCORE: 0.44

## 2022-05-19 NOTE — ASSESSMENT & PLAN NOTE
Chronic and ongoing. Currently taking OTC Claritin. She states that for the most part it helps with her allergies.

## 2022-05-19 NOTE — ASSESSMENT & PLAN NOTE
Chronic and ongoing. She states that since she got COVID in January she has been struggling with catching her breath. She does use an Albuterol inhaler which has helped. She states that early in the morning and in the evening is when it occurs the most. She states that sometimes she coughs so hard that she ends up throwing up.

## 2022-05-19 NOTE — PROGRESS NOTES
Subjective  Chief Complaint  Shortness of Breath    History of Present Illness  Lela Olivas is a 36 y.o. female. This established patient is here today to discuss her shortness of breath.    Shortness of breath  Chronic and ongoing. She states that since she got COVID in January she has been struggling with catching her breath. She does use an Albuterol inhaler which has helped. She states that early in the morning and in the evening is when it occurs the most. She states that sometimes she coughs so hard that she ends up throwing up.    Environmental and seasonal allergies  Chronic and ongoing. Currently taking OTC Claritin. She states that for the most part it helps with her allergies.    Past Medical History    Allergies: Meat extract  Past Medical History:   Diagnosis Date   • Anxiety    • Grade I hemorrhoids 2018   • Insomnia    • Migraine    • No known health problems    • Pain related to vaginal delivery 2019   •  (spontaneous vaginal delivery) 2019     Past Surgical History:   Procedure Laterality Date   • OTHER      cyst removed   • OTHER      not sure, soft tissue or skin lesion      Current Outpatient Medications Ordered in Epic   Medication Sig Dispense Refill   • montelukast (SINGULAIR) 10 MG Tab Take 1 Tablet by mouth every day. 90 Tablet 1   • albuterol 108 (90 Base) MCG/ACT Aero Soln inhalation aerosol Inhale 2 Puffs every 6 hours as needed for Shortness of Breath. 8.5 g 2   • promethazine-dextromethorphan (PROMETHAZINE-DM) 6.25-15 MG/5ML syrup Take 5 mL by mouth every four hours as needed for Cough. 120 mL 0     No current Epic-ordered facility-administered medications on file.     Family History:    Family History   Problem Relation Age of Onset   • Psychiatric Illness Mother         depression   • Diabetes Father    • Cancer Maternal Uncle         lung cancer, smoker   • Cancer Paternal Grandfather         brain cancer   • Heart Disease Maternal Uncle         MI   •  "Stroke Neg Hx    • Alcohol/Drug Neg Hx       Personal/Social History:    Social History     Tobacco Use   • Smoking status: Never Smoker   • Smokeless tobacco: Never Used   Vaping Use   • Vaping Use: Never used   Substance Use Topics   • Alcohol use: No   • Drug use: No     Social History     Social History Narrative   • Not on file      Review of Systems:   General: Negative for fever/chills and unexpected weight change.   Eyes:  Negative for vision changes, eye pain.  Respiratory:  Positive for cough and dyspnea.    Cardiovascular:  Negative for chest pain and palpitations.  Musculoskeletal:  Negative for myalgias.   Skin:  Negative for rash.   Neurological:  Negative for numbness/tingling and headaches.     Objective  Physical Exam:   /60 (BP Location: Left arm, Patient Position: Sitting, BP Cuff Size: Adult)   Pulse 64   Temp 36.9 °C (98.5 °F) (Temporal)   Resp 20   Ht 1.575 m (5' 2\")   Wt 59.1 kg (130 lb 6 oz)   SpO2 98%  Body mass index is 23.85 kg/m².  General:  Alert and oriented.  Well appearing.  NAD  Neck: Supple without JVD. No lymphadenopathy.  Pulmonary:  Normal effort.  Clear to ausculation without rales, ronchi, or wheezing.  Cardiovascular:  Regular rate and rhythm without murmur, rubs or gallop.   Skin:  Warm and dry.  No obvious lesions.  Musculoskeletal:  No extremity cyanosis, clubbing, or edema.      Assessment/Plan  1. Shortness of breath  Chronic and ongoing.  Discussed getting a chest x-ray, she is agreeable.  Discussed with her that since the shortness of breath and her cough continues that she should be seen by pulmonary, she is agreeable.  Continue to use Albuterol inhaler as needed.  Continue to use Promethazine-DM cough syrup as needed.  - DX-CHEST-2 VIEWS; Future  - Referral to Pulmonary and Sleep Medicine  - albuterol 108 (90 Base) MCG/ACT Aero Soln inhalation aerosol; Inhale 2 Puffs every 6 hours as needed for Shortness of Breath.  Dispense: 8.5 g; Refill: 2  - " promethazine-dextromethorphan (PROMETHAZINE-DM) 6.25-15 MG/5ML syrup; Take 5 mL by mouth every four hours as needed for Cough.  Dispense: 120 mL; Refill: 0    2. Environmental and seasonal allergies  Chronic and ongoing.  Discussed Montelukast risks, benefits and side effects, she verbalized understanding.  - montelukast (SINGULAIR) 10 MG Tab; Take 1 Tablet by mouth every day.  Dispense: 90 Tablet; Refill: 1        Health Maintenance: Completed    Return in about 1 month (around 6/19/2022) for F/U.    Please note that this dictation was created using voice recognition software. I have made every reasonable attempt to correct obvious errors, but I expect that there are errors of grammar and possibly content that I did not discover before finalizing the note.    VENICE Coughlin  [facility]

## 2022-08-02 ENCOUNTER — HOSPITAL ENCOUNTER (OUTPATIENT)
Dept: LAB | Facility: MEDICAL CENTER | Age: 37
End: 2022-08-02
Attending: NURSE PRACTITIONER
Payer: COMMERCIAL

## 2022-08-02 ENCOUNTER — OFFICE VISIT (OUTPATIENT)
Dept: MEDICAL GROUP | Facility: PHYSICIAN GROUP | Age: 37
End: 2022-08-02
Payer: COMMERCIAL

## 2022-08-02 VITALS
SYSTOLIC BLOOD PRESSURE: 82 MMHG | DIASTOLIC BLOOD PRESSURE: 56 MMHG | TEMPERATURE: 98.9 F | HEIGHT: 62 IN | WEIGHT: 125.6 LBS | BODY MASS INDEX: 23.11 KG/M2 | HEART RATE: 77 BPM | OXYGEN SATURATION: 98 %

## 2022-08-02 DIAGNOSIS — R06.02 SHORTNESS OF BREATH: ICD-10-CM

## 2022-08-02 DIAGNOSIS — R20.8 BURNING SENSATION: ICD-10-CM

## 2022-08-02 DIAGNOSIS — Z13.29 SCREENING FOR THYROID DISORDER: ICD-10-CM

## 2022-08-02 PROCEDURE — 36415 COLL VENOUS BLD VENIPUNCTURE: CPT

## 2022-08-02 PROCEDURE — 84439 ASSAY OF FREE THYROXINE: CPT

## 2022-08-02 PROCEDURE — 84443 ASSAY THYROID STIM HORMONE: CPT

## 2022-08-02 PROCEDURE — 86376 MICROSOMAL ANTIBODY EACH: CPT

## 2022-08-02 PROCEDURE — 84480 ASSAY TRIIODOTHYRONINE (T3): CPT

## 2022-08-02 PROCEDURE — 99214 OFFICE O/P EST MOD 30 MIN: CPT | Performed by: NURSE PRACTITIONER

## 2022-08-02 RX ORDER — DEXTROMETHORPHAN HYDROBROMIDE AND PROMETHAZINE HYDROCHLORIDE 15; 6.25 MG/5ML; MG/5ML
5 SYRUP ORAL EVERY 4 HOURS PRN
Qty: 120 ML | Refills: 0 | Status: SHIPPED | OUTPATIENT
Start: 2022-08-02 | End: 2022-08-02

## 2022-08-02 RX ORDER — DEXTROMETHORPHAN HYDROBROMIDE AND PROMETHAZINE HYDROCHLORIDE 15; 6.25 MG/5ML; MG/5ML
5 SYRUP ORAL EVERY 4 HOURS PRN
Qty: 120 ML | Refills: 2 | Status: SHIPPED | OUTPATIENT
Start: 2022-08-02 | End: 2022-11-21

## 2022-08-02 ASSESSMENT — FIBROSIS 4 INDEX: FIB4 SCORE: 0.44

## 2022-08-02 NOTE — ASSESSMENT & PLAN NOTE
Acute and ongoing. She states that for the past month she will have a burning sensation go across her back. She states that it will happen once or twice a day. The burning sensation will end up going away on its own. It is only on the upper back. Denies any trauma or injuries to her upper back area. She would like to get her thyroid levels checked.

## 2022-08-02 NOTE — ASSESSMENT & PLAN NOTE
Chronic and ongoing. She states that she is still having shortness of breath since getting COVID in January. She has been taking Montelukast 10 mg daily. She also has an as needed Albuterol inhaler to use as needed. She states that the Promethazine cough medicine helped a lot. She is hoping that she can get a refill of that today.

## 2022-08-02 NOTE — PROGRESS NOTES
Subjective  Chief Complaint  Shortness of Breath and Burning Sensation    History of Present Illness  Lela Olivas is a 36 y.o. female. This established patient is here today to discuss her shortness of breath and burning sensation.    Shortness of breath  Chronic and ongoing. She states that she is still having shortness of breath since getting COVID in January. She has been taking Montelukast 10 mg daily. She also has an as needed Albuterol inhaler to use as needed. She states that the Promethazine cough medicine helped a lot. She is hoping that she can get a refill of that today.    Burning sensation  Acute and ongoing. She states that for the past month she will have a burning sensation go across her back. She states that it will happen once or twice a day. The burning sensation will end up going away on its own. It is only on the upper back. Denies any trauma or injuries to her upper back area. She would like to get her thyroid levels checked.    Past Medical History    Allergies: Meat extract  Past Medical History:   Diagnosis Date   • Anxiety    • Grade I hemorrhoids 2018   • Insomnia    • Migraine    • No known health problems    • Pain related to vaginal delivery 2019   •  (spontaneous vaginal delivery) 2019     Past Surgical History:   Procedure Laterality Date   • OTHER      cyst removed   • OTHER      not sure, soft tissue or skin lesion      Current Outpatient Medications Ordered in Epic   Medication Sig Dispense Refill   • promethazine-dextromethorphan (PROMETHAZINE-DM) 6.25-15 MG/5ML syrup Take 5 mL by mouth every four hours as needed for Cough. 120 mL 2   • montelukast (SINGULAIR) 10 MG Tab Take 1 Tablet by mouth every day. 90 Tablet 1   • albuterol 108 (90 Base) MCG/ACT Aero Soln inhalation aerosol Inhale 2 Puffs every 6 hours as needed for Shortness of Breath. 8.5 g 2     No current Epic-ordered facility-administered medications on file.     Family History:    Family  "History   Problem Relation Age of Onset   • Psychiatric Illness Mother         depression   • Diabetes Father    • Cancer Maternal Uncle         lung cancer, smoker   • Cancer Paternal Grandfather         brain cancer   • Heart Disease Maternal Uncle         MI   • Stroke Neg Hx    • Alcohol/Drug Neg Hx       Personal/Social History:    Social History     Tobacco Use   • Smoking status: Never Smoker   • Smokeless tobacco: Never Used   Vaping Use   • Vaping Use: Never used   Substance Use Topics   • Alcohol use: No   • Drug use: No     Social History     Social History Narrative   • Not on file      Review of Systems:   General: Negative for fever/chills and unexpected weight change.   Eyes:  Negative for vision changes, eye pain.  Respiratory: Positive for cough and dyspnea.  Cardiovascular:  Negative for chest pain and palpitations.  Musculoskeletal:  Negative for myalgias.   Skin:  Negative for rash.   Neurological:  Negative for numbness/tingling and headaches.     Objective  Physical Exam:   BP (!) 82/56 (BP Location: Left arm, Patient Position: Sitting, BP Cuff Size: Adult)   Pulse 77   Temp 37.2 °C (98.9 °F) (Temporal)   Ht 1.575 m (5' 2\")   Wt 57 kg (125 lb 9.6 oz)   SpO2 98%  Body mass index is 22.97 kg/m².  General:  Alert and oriented.  Well appearing.  NAD  Neck: Supple without JVD. No lymphadenopathy.  Pulmonary:  Normal effort.  Clear to ausculation without rales, ronchi, or wheezing.  Cardiovascular:  Regular rate and rhythm without murmur, rubs or gallop.   Skin:  Warm and dry.  No obvious lesions.  Musculoskeletal:  No extremity cyanosis, clubbing, or edema.      Assessment/Plan  1. Shortness of breath  Chronic and ongoing.  Continue to take Montelukast 10 mg daily.  Continue to use Albuterol inhaler.  Continue to use Promethazine-DM every 4 hours as needed.  She does have an appointment with Pulmonary coming up.  - promethazine-dextromethorphan (PROMETHAZINE-DM) 6.25-15 MG/5ML syrup; Take 5 mL " by mouth every four hours as needed for Cough.  Dispense: 120 mL; Refill: 2    2. Burning sensation  Acute and ongoing.  Discussed getting her Thyroid levels checked, she is agreeable.  She states that she will monitor her symptoms and see if she can correlate the burning sensation to anything.  - TSH; Future  - TRIIDOTHYRONINE; Future  - FREE THYROXINE; Future  - THYROID PEROXIDASE  (TPO) AB; Future    3. Screening for thyroid disorder  Due for updated labs.  - TSH; Future  - TRIIDOTHYRONINE; Future  - FREE THYROXINE; Future  - THYROID PEROXIDASE  (TPO) AB; Future      Health Maintenance: Completed    Return if symptoms worsen or fail to improve.    Please note that this dictation was created using voice recognition software. I have made every reasonable attempt to correct obvious errors, but I expect that there are errors of grammar and possibly content that I did not discover before finalizing the note.    VENICE Coughlin  [facility]

## 2022-08-03 LAB
T3 SERPL-MCNC: 91.1 NG/DL (ref 60–181)
T4 FREE SERPL-MCNC: 1.46 NG/DL (ref 0.93–1.7)
THYROPEROXIDASE AB SERPL-ACNC: <9 IU/ML (ref 0–9)
TSH SERPL DL<=0.005 MIU/L-ACNC: 0.95 UIU/ML (ref 0.38–5.33)

## 2022-09-18 ENCOUNTER — HOSPITAL ENCOUNTER (OUTPATIENT)
Facility: MEDICAL CENTER | Age: 37
End: 2022-09-18
Attending: NURSE PRACTITIONER
Payer: COMMERCIAL

## 2022-09-18 ENCOUNTER — OFFICE VISIT (OUTPATIENT)
Dept: URGENT CARE | Facility: CLINIC | Age: 37
End: 2022-09-18
Payer: COMMERCIAL

## 2022-09-18 VITALS
OXYGEN SATURATION: 98 % | DIASTOLIC BLOOD PRESSURE: 62 MMHG | BODY MASS INDEX: 22.5 KG/M2 | TEMPERATURE: 98 F | WEIGHT: 127 LBS | SYSTOLIC BLOOD PRESSURE: 98 MMHG | HEART RATE: 97 BPM | HEIGHT: 63 IN | RESPIRATION RATE: 18 BRPM

## 2022-09-18 DIAGNOSIS — J98.8 RTI (RESPIRATORY TRACT INFECTION): ICD-10-CM

## 2022-09-18 DIAGNOSIS — R05.9 COUGH: ICD-10-CM

## 2022-09-18 DIAGNOSIS — J02.0 PHARYNGITIS DUE TO STREPTOCOCCUS SPECIES: ICD-10-CM

## 2022-09-18 LAB
INT CON NEG: NORMAL
INT CON POS: NORMAL
S PYO AG THROAT QL: POSITIVE

## 2022-09-18 PROCEDURE — U0003 INFECTIOUS AGENT DETECTION BY NUCLEIC ACID (DNA OR RNA); SEVERE ACUTE RESPIRATORY SYNDROME CORONAVIRUS 2 (SARS-COV-2) (CORONAVIRUS DISEASE [COVID-19]), AMPLIFIED PROBE TECHNIQUE, MAKING USE OF HIGH THROUGHPUT TECHNOLOGIES AS DESCRIBED BY CMS-2020-01-R: HCPCS

## 2022-09-18 PROCEDURE — 99213 OFFICE O/P EST LOW 20 MIN: CPT | Performed by: NURSE PRACTITIONER

## 2022-09-18 PROCEDURE — 87880 STREP A ASSAY W/OPTIC: CPT | Performed by: NURSE PRACTITIONER

## 2022-09-18 PROCEDURE — U0005 INFEC AGEN DETEC AMPLI PROBE: HCPCS

## 2022-09-18 RX ORDER — AMOXICILLIN 500 MG/1
500 CAPSULE ORAL 2 TIMES DAILY
Qty: 20 CAPSULE | Refills: 0 | Status: SHIPPED | OUTPATIENT
Start: 2022-09-18 | End: 2022-09-28

## 2022-09-18 ASSESSMENT — ENCOUNTER SYMPTOMS
HEADACHES: 1
SINUS PAIN: 0
MYALGIAS: 0
FEVER: 0
EYE REDNESS: 0
SORE THROAT: 1
COUGH: 1
NAUSEA: 0
CHILLS: 1
DIZZINESS: 0
SHORTNESS OF BREATH: 0
VOMITING: 0
RHINORRHEA: 1

## 2022-09-18 ASSESSMENT — FIBROSIS 4 INDEX: FIB4 SCORE: 0.44

## 2022-09-18 NOTE — PROGRESS NOTES
Subjective:   Lela Olivas is a 36 y.o. female who presents for Sore Throat (Sore throat, runny nose, stuffy nose, fever, light headache, chills, cough)      URI   This is a new problem. The current episode started in the past 7 days. The problem has been unchanged. There has been no fever. Associated symptoms include congestion, coughing, ear pain, headaches, a plugged ear sensation, rhinorrhea and a sore throat. Pertinent negatives include no chest pain, dysuria, nausea, rash, sinus pain, sneezing or vomiting. She has tried acetaminophen and sleep for the symptoms. The treatment provided no relief.     Review of Systems   Constitutional:  Positive for chills and malaise/fatigue. Negative for fever.   HENT:  Positive for congestion, ear pain, rhinorrhea and sore throat. Negative for sinus pain and sneezing.    Eyes:  Negative for redness.   Respiratory:  Positive for cough. Negative for shortness of breath.    Cardiovascular:  Negative for chest pain.   Gastrointestinal:  Negative for nausea and vomiting.   Genitourinary:  Negative for dysuria.   Musculoskeletal:  Negative for myalgias.   Skin:  Negative for rash.   Neurological:  Positive for headaches. Negative for dizziness.     Medications:    albuterol Aers  montelukast Tabs  promethazine-dextromethorphan    Allergies: Meat extract    Problem List: Lela Olivas does not have any pertinent problems on file.    Surgical History:  Past Surgical History:   Procedure Laterality Date    OTHER      cyst removed    OTHER      not sure, soft tissue or skin lesion        Past Social Hx: Lela Olivas  reports that she has never smoked. She has never used smokeless tobacco. She reports that she does not drink alcohol and does not use drugs.     Past Family Hx:  Lela Olivas family history includes Cancer in her maternal uncle and paternal grandfather; Diabetes in her father; Heart Disease in her maternal uncle; Psychiatric Illness in her  "mother.     Problem list, medications, and allergies reviewed by myself today in Epic.     Objective:     BP (!) 98/62 (BP Location: Left arm, Patient Position: Sitting, BP Cuff Size: Small adult)   Pulse 97   Temp 36.7 °C (98 °F) (Temporal)   Resp 18   Ht 1.6 m (5' 3\")   Wt 57.6 kg (127 lb)   SpO2 98%   BMI 22.50 kg/m²     Physical Exam  Vitals and nursing note reviewed.   Constitutional:       General: She is not in acute distress.     Appearance: She is well-developed.   HENT:      Head: Normocephalic and atraumatic.      Right Ear: Tympanic membrane and external ear normal.      Left Ear: Tympanic membrane and external ear normal.      Nose: Nose normal.      Right Sinus: No maxillary sinus tenderness or frontal sinus tenderness.      Left Sinus: No maxillary sinus tenderness or frontal sinus tenderness.      Mouth/Throat:      Mouth: Mucous membranes are moist.      Pharynx: Uvula midline. No posterior oropharyngeal erythema.      Tonsils: No tonsillar exudate or tonsillar abscesses.   Eyes:      General:         Right eye: No discharge.         Left eye: No discharge.      Conjunctiva/sclera: Conjunctivae normal.   Cardiovascular:      Rate and Rhythm: Normal rate.   Pulmonary:      Effort: Pulmonary effort is normal. No respiratory distress.      Breath sounds: Normal breath sounds.   Abdominal:      General: There is no distension.   Musculoskeletal:         General: Normal range of motion.   Skin:     General: Skin is warm and dry.   Neurological:      General: No focal deficit present.      Mental Status: She is alert and oriented to person, place, and time. Mental status is at baseline.      Gait: Gait (gait at baseline) normal.   Psychiatric:         Judgment: Judgment normal.       Assessment/Plan:     Diagnosis and associated orders:   1. Pharyngitis due to Streptococcus species  POCT Rapid Strep A    amoxicillin (AMOXIL) 500 MG Cap      2. RTI (respiratory tract infection)  SARS-CoV-2 PCR (24 " hour In-House): Collect NP swab in VTM      3. Cough               Comments/MDM:     POSITIVE Strep A.  Discussed treatment of Amoxicillin for 10 days, salt water gargles, soft foods, cool liquids, ibuprofen and Tylenol for any pain or fevers.   Return to the urgent care if symptoms are not improving or any worsening symptoms or concerns. Present to the emergency room or call 911 if any severe swelling of the throat, difficulty swallowing, difficulty breathing, wheezing, or any other severe concerns.     Differential diagnosis, natural history, supportive care, and indications for immediate follow-up discussed.                Please note that this dictation was created using voice recognition software. I have made a reasonable attempt to correct obvious errors, but I expect that there are errors of grammar and possibly content that I did not discover before finalizing the note.    This note was electronically signed by Kevin MILLARD.

## 2022-09-18 NOTE — LETTER
September 18, 2022         Patient: Lela Olivas   YOB: 1985   Date of Visit: 9/18/2022           To Whom it May Concern:    Lela Olivas was seen in my clinic on 9/18/2022. She may return to work on 9/20/22, if COVID testing is negative.    If you have any questions or concerns, please don't hesitate to call.        Sincerely,           LAURENT Garcia.  Electronically Signed

## 2022-09-19 DIAGNOSIS — J98.8 RTI (RESPIRATORY TRACT INFECTION): ICD-10-CM

## 2022-11-21 ENCOUNTER — OFFICE VISIT (OUTPATIENT)
Dept: URGENT CARE | Facility: CLINIC | Age: 37
End: 2022-11-21
Payer: COMMERCIAL

## 2022-11-21 ENCOUNTER — HOSPITAL ENCOUNTER (OUTPATIENT)
Facility: MEDICAL CENTER | Age: 37
End: 2022-11-21
Attending: FAMILY MEDICINE
Payer: COMMERCIAL

## 2022-11-21 VITALS
HEIGHT: 62 IN | DIASTOLIC BLOOD PRESSURE: 60 MMHG | RESPIRATION RATE: 18 BRPM | WEIGHT: 131 LBS | TEMPERATURE: 97.7 F | HEART RATE: 67 BPM | SYSTOLIC BLOOD PRESSURE: 110 MMHG | OXYGEN SATURATION: 100 % | BODY MASS INDEX: 24.11 KG/M2

## 2022-11-21 DIAGNOSIS — Z03.818 ENCOUNTER FOR PATIENT CONCERN ABOUT EXPOSURE TO INFECTIOUS ORGANISM: ICD-10-CM

## 2022-11-21 LAB
FLUAV+FLUBV AG SPEC QL IA: NEGATIVE
INT CON NEG: NORMAL
INT CON POS: NORMAL

## 2022-11-21 PROCEDURE — U0005 INFEC AGEN DETEC AMPLI PROBE: HCPCS

## 2022-11-21 PROCEDURE — 87804 INFLUENZA ASSAY W/OPTIC: CPT | Performed by: FAMILY MEDICINE

## 2022-11-21 PROCEDURE — 99213 OFFICE O/P EST LOW 20 MIN: CPT | Mod: CS | Performed by: FAMILY MEDICINE

## 2022-11-21 PROCEDURE — U0003 INFECTIOUS AGENT DETECTION BY NUCLEIC ACID (DNA OR RNA); SEVERE ACUTE RESPIRATORY SYNDROME CORONAVIRUS 2 (SARS-COV-2) (CORONAVIRUS DISEASE [COVID-19]), AMPLIFIED PROBE TECHNIQUE, MAKING USE OF HIGH THROUGHPUT TECHNOLOGIES AS DESCRIBED BY CMS-2020-01-R: HCPCS

## 2022-11-21 ASSESSMENT — FIBROSIS 4 INDEX: FIB4 SCORE: 0.44

## 2022-11-21 NOTE — LETTER
November 21, 2022    To Whom It May Concern:         This is confirmation that Lela Willard Deisy attended her scheduled appointment with Adela Caba M.D. on 11/21/22. COVID testing is in progress, please allow up to 72 hours for results.        If you have any questions please do not hesitate to call me at the phone number listed below.    Sincerely,          Adela Caba M.D.  [phone]

## 2022-11-22 DIAGNOSIS — Z03.818 ENCOUNTER FOR PATIENT CONCERN ABOUT EXPOSURE TO INFECTIOUS ORGANISM: ICD-10-CM

## 2022-11-22 LAB
SARS-COV-2 RNA RESP QL NAA+PROBE: NOTDETECTED
SPECIMEN SOURCE: NORMAL

## 2022-11-22 NOTE — PROGRESS NOTES
"  Subjective:      36 y.o. female presents to urgent care for cold symptoms that started Saturday.  She is experiencing headache, body ache, and subjective fevers.  No sore throat, cough, or diarrhea.  She has been using ibuprofen with good relief in symptoms. She denies any tobacco product use.  She does have asthma for which she uses albuterol as needed.  Typically she uses this 0 to once per month, but since getting ill has been using it once to 2 times per day.  She is fully vaccinated against COVID.  Several family members are currently sick with similar symptoms.    She denies any other questions or concerns at this time.    Current problem list, medication, and past medical/surgical history were reviewed in Epic.    ROS  See HPI     Objective:      /60 (BP Location: Right arm, Patient Position: Sitting, BP Cuff Size: Adult)   Pulse 67   Temp 36.5 °C (97.7 °F) (Temporal)   Resp 18   Ht 1.575 m (5' 2\")   Wt 59.4 kg (131 lb)   SpO2 100%   BMI 23.96 kg/m²     Physical Exam  Constitutional:       General: She is not in acute distress.     Appearance: She is not diaphoretic.   HENT:      Right Ear: Tympanic membrane, ear canal and external ear normal.      Left Ear: Tympanic membrane, ear canal and external ear normal.      Mouth/Throat:      Tongue: Tongue does not deviate from midline.      Palate: No lesions.      Pharynx: Uvula midline. No posterior oropharyngeal erythema.      Tonsils: No tonsillar exudate. 1+ on the right. 1+ on the left.   Cardiovascular:      Rate and Rhythm: Normal rate and regular rhythm.      Heart sounds: Normal heart sounds.   Pulmonary:      Effort: Pulmonary effort is normal. No respiratory distress.      Breath sounds: Normal breath sounds.   Neurological:      Mental Status: She is alert.   Psychiatric:         Mood and Affect: Affect normal.         Judgment: Judgment normal.     Assessment/Plan:     1. Encounter for patient concern about exposure to infectious " organism  Rapid flu negative.  PCR COVID has been sent. In the meantime patient was advised to isolate until COVID test results returned. I encouraged mask use, frequent handwashing, wiping down hard surfaces, etc. Tylenol and Ibuprofen were recommended for symptomatic relief. If positive they will be contacted by their local health department regarding return to work/school protocols. Patient is currently without indication of need for higher level of care. Patient/Guardian was given precautionary signs/symptoms that mandate immediate follow up and evaluation in the ED. The patient and/or guardian demonstrated a good understanding and agreed with the treatment plan.  - POCT Influenza A/B  - SARS-CoV-2 PCR (24 hour In-House): Collect NP swab in VTM; Future      Instructed to return to Urgent Care or nearest Emergency Department if symptoms fail to improve, for any change in condition, further concerns, or new concerning symptoms. Patient states understanding of the plan of care and discharge instructions.    Adela Caba M.D.

## 2023-01-11 ENCOUNTER — OFFICE VISIT (OUTPATIENT)
Dept: URGENT CARE | Facility: CLINIC | Age: 38
End: 2023-01-11
Payer: COMMERCIAL

## 2023-01-11 VITALS
WEIGHT: 131 LBS | OXYGEN SATURATION: 100 % | RESPIRATION RATE: 16 BRPM | HEART RATE: 85 BPM | DIASTOLIC BLOOD PRESSURE: 58 MMHG | BODY MASS INDEX: 23.96 KG/M2 | SYSTOLIC BLOOD PRESSURE: 100 MMHG | TEMPERATURE: 97.1 F

## 2023-01-11 DIAGNOSIS — R05.1 ACUTE COUGH: ICD-10-CM

## 2023-01-11 DIAGNOSIS — J45.41 MODERATE PERSISTENT ASTHMA WITH EXACERBATION: ICD-10-CM

## 2023-01-11 PROCEDURE — 99214 OFFICE O/P EST MOD 30 MIN: CPT | Performed by: NURSE PRACTITIONER

## 2023-01-11 RX ORDER — ALBUTEROL SULFATE 90 UG/1
2 AEROSOL, METERED RESPIRATORY (INHALATION) EVERY 6 HOURS PRN
Qty: 8.5 G | Refills: 0 | Status: SHIPPED | OUTPATIENT
Start: 2023-01-11

## 2023-01-11 RX ORDER — METHYLPREDNISOLONE 4 MG/1
TABLET ORAL
Qty: 21 TABLET | Refills: 0 | Status: SHIPPED | OUTPATIENT
Start: 2023-01-11 | End: 2023-02-12

## 2023-01-11 RX ORDER — BENZONATATE 100 MG/1
100 CAPSULE ORAL 3 TIMES DAILY PRN
Qty: 60 CAPSULE | Refills: 0 | Status: SHIPPED | OUTPATIENT
Start: 2023-01-11 | End: 2023-02-12

## 2023-01-11 ASSESSMENT — FIBROSIS 4 INDEX: FIB4 SCORE: 0.46

## 2023-01-11 ASSESSMENT — ENCOUNTER SYMPTOMS
COUGH: 1
HEADACHES: 0
SPUTUM PRODUCTION: 0
CHILLS: 0
SHORTNESS OF BREATH: 1
VOMITING: 0
DIZZINESS: 0
FREQUENT THROAT CLEARING: 1
CHEST TIGHTNESS: 1
EYE REDNESS: 0
HEMOPTYSIS: 0
TROUBLE SWALLOWING: 0
MYALGIAS: 0
DIFFICULTY BREATHING: 1
WHEEZING: 1
SORE THROAT: 0
FEVER: 0
NAUSEA: 0

## 2023-01-11 ASSESSMENT — COPD QUESTIONNAIRES: COPD: 0

## 2023-01-11 NOTE — LETTER
January 11, 2023         Patient: Lela Olivas   YOB: 1985   Date of Visit: 1/11/2023           To Whom it May Concern:    Lela Olivas was seen in my clinic on 1/11/2023.     If you have any questions or concerns, please don't hesitate to call.        Sincerely,           LAURENT Garcia.  Electronically Signed

## 2023-01-12 NOTE — PROGRESS NOTES
Subjective:   Lela Olivas is a 37 y.o. female who presents for Cough and Asthma      Asthma  She complains of chest tightness, cough, difficulty breathing, frequent throat clearing, shortness of breath and wheezing. There is no hemoptysis or sputum production. This is a new problem. Episode onset: 3 days; denies sick contacts.  Does have a pulmonology appointment to establish care 20th of this month. The problem occurs constantly. The problem has been gradually worsening. The cough is non-productive. Pertinent negatives include no chest pain, ear congestion, ear pain, fever, headaches, malaise/fatigue, myalgias, nasal congestion, postnasal drip, sore throat or trouble swallowing. Her symptoms are aggravated by nothing. Her symptoms are alleviated by nothing. She reports no improvement on treatment. Her symptoms are not alleviated by beta-agonist. There are no known risk factors for lung disease. Her past medical history is significant for asthma. There is no history of COPD or pneumonia.     Review of Systems   Constitutional:  Negative for chills, fever and malaise/fatigue.   HENT:  Negative for ear pain, postnasal drip, sore throat and trouble swallowing.    Eyes:  Negative for redness.   Respiratory:  Positive for cough, shortness of breath and wheezing. Negative for hemoptysis and sputum production.    Cardiovascular:  Negative for chest pain.   Gastrointestinal:  Negative for nausea and vomiting.   Genitourinary:  Negative for dysuria.   Musculoskeletal:  Negative for myalgias.   Skin:  Negative for rash.   Neurological:  Negative for dizziness and headaches.     Medications:    albuterol Aers  benzonatate Caps  methylPREDNISolone Tbpk  montelukast Tabs    Allergies: Meat extract    Problem List: Lela Olivas does not have any pertinent problems on file.    Surgical History:  Past Surgical History:   Procedure Laterality Date    OTHER      cyst removed    OTHER      not sure, soft tissue or  skin lesion        Past Social Hx: Lela Olivas  reports that she has never smoked. She has never used smokeless tobacco. She reports that she does not drink alcohol and does not use drugs.     Past Family Hx:  Lela Olivas family history includes Cancer in her maternal uncle and paternal grandfather; Diabetes in her father; Heart Disease in her maternal uncle; Psychiatric Illness in her mother.     Problem list, medications, and allergies reviewed by myself today in Epic.     Objective:     /58 (BP Location: Right arm, Patient Position: Sitting, BP Cuff Size: Adult)   Pulse 85   Temp 36.2 °C (97.1 °F)   Resp 16   Wt 59.4 kg (131 lb)   SpO2 100%   BMI 23.96 kg/m²     Physical Exam  Vitals and nursing note reviewed.   Constitutional:       General: She is not in acute distress.     Appearance: She is well-developed.   HENT:      Head: Normocephalic and atraumatic.      Right Ear: Tympanic membrane and external ear normal.      Left Ear: Tympanic membrane and external ear normal.      Nose: Nose normal.      Right Sinus: No maxillary sinus tenderness or frontal sinus tenderness.      Left Sinus: No maxillary sinus tenderness or frontal sinus tenderness.      Mouth/Throat:      Mouth: Mucous membranes are moist.      Pharynx: Uvula midline. No posterior oropharyngeal erythema.      Tonsils: No tonsillar exudate or tonsillar abscesses.   Eyes:      General:         Right eye: No discharge.         Left eye: No discharge.      Conjunctiva/sclera: Conjunctivae normal.   Cardiovascular:      Rate and Rhythm: Normal rate.   Pulmonary:      Effort: Pulmonary effort is normal. No respiratory distress.      Breath sounds: Wheezing present.   Abdominal:      General: There is no distension.   Musculoskeletal:         General: Normal range of motion.   Skin:     General: Skin is warm and dry.   Neurological:      General: No focal deficit present.      Mental Status: She is alert and oriented to  person, place, and time. Mental status is at baseline.      Gait: Gait (gait at baseline) normal.   Psychiatric:         Judgment: Judgment normal.       Assessment/Plan:     Diagnosis and associated orders:     1. Moderate persistent asthma with exacerbation  methylPREDNISolone (MEDROL DOSEPAK) 4 MG Tablet Therapy Pack    albuterol 108 (90 Base) MCG/ACT Aero Soln inhalation aerosol      2. Acute cough  benzonatate (TESSALON) 100 MG Cap         Comments/MDM:     Patient has a an acute exacerbation of asthma does have a scheduled appointment to establish care with pulmonology denies sick contacts.  Denied COVID testing. I personally reviewed prior external notes and prior test results pertinent to today's visit.   Discussed management options, risks and benefits, and alternatives to treatment plan agreed upon.   Red flags discussed and indications to immediately call 911 or present to the Emergency Department.   Supportive care, differential diagnoses, and indications for immediate follow-up discussed with patient.    Patient expresses understanding and agrees to plan. Patient denies any other questions or concerns.              Please note that this dictation was created using voice recognition software. I have made a reasonable attempt to correct obvious errors, but I expect that there are errors of grammar and possibly content that I did not discover before finalizing the note.    This note was electronically signed by Kevin MILLARD.

## 2023-02-12 ENCOUNTER — OFFICE VISIT (OUTPATIENT)
Dept: URGENT CARE | Facility: PHYSICIAN GROUP | Age: 38
End: 2023-02-12
Payer: COMMERCIAL

## 2023-02-12 VITALS
DIASTOLIC BLOOD PRESSURE: 60 MMHG | WEIGHT: 132 LBS | TEMPERATURE: 97.7 F | OXYGEN SATURATION: 97 % | HEART RATE: 78 BPM | RESPIRATION RATE: 18 BRPM | SYSTOLIC BLOOD PRESSURE: 104 MMHG | HEIGHT: 63 IN | BODY MASS INDEX: 23.39 KG/M2

## 2023-02-12 DIAGNOSIS — Z20.818 EXPOSURE TO STREP THROAT: ICD-10-CM

## 2023-02-12 DIAGNOSIS — J02.9 SORE THROAT: ICD-10-CM

## 2023-02-12 PROCEDURE — 99213 OFFICE O/P EST LOW 20 MIN: CPT | Performed by: NURSE PRACTITIONER

## 2023-02-12 RX ORDER — LIDOCAINE HYDROCHLORIDE 20 MG/ML
15 SOLUTION OROPHARYNGEAL
Qty: 100 ML | Refills: 0 | Status: SHIPPED | OUTPATIENT
Start: 2023-02-12

## 2023-02-12 RX ORDER — AMOXICILLIN 500 MG/1
500 CAPSULE ORAL 2 TIMES DAILY
Qty: 20 CAPSULE | Refills: 0 | Status: SHIPPED | OUTPATIENT
Start: 2023-02-12 | End: 2023-02-22

## 2023-02-12 ASSESSMENT — VISUAL ACUITY: OU: 1

## 2023-02-12 ASSESSMENT — ENCOUNTER SYMPTOMS
SORE THROAT: 1
FEVER: 0
CONSTITUTIONAL NEGATIVE: 1
SHORTNESS OF BREATH: 0
COUGH: 1

## 2023-02-12 ASSESSMENT — FIBROSIS 4 INDEX: FIB4 SCORE: 0.46

## 2023-02-12 NOTE — LETTER
February 12, 2023         Patient: Lela Olivas   YOB: 1985   Date of Visit: 2/12/2023           To Whom it May Concern:    Lela Olivas was seen in my clinic on 2/12/2023 due to illness. Due to medical necessity, please excuse patient from work 2/9/2023 and 2/12/2023 as well as for the next 3 days as needed.       If you have any questions or concerns, please don't hesitate to call.        Sincerely,           LAURENT Lemons.  Electronically Signed

## 2023-02-12 NOTE — PROGRESS NOTES
Subjective:     Lela Olivas is a 37 y.o. female who presents for Cough (Onset recent), Pharyngitis (Onset 1 week/ is positive strep), and Nasal Congestion (Onset 1 week)       Cough  This is a new problem. Associated symptoms include a sore throat. Pertinent negatives include no fever or shortness of breath.   Pharyngitis   This is a new problem. The problem has been gradually worsening. Associated symptoms include congestion and coughing. Pertinent negatives include no shortness of breath.      diagnosed with strep and is being treated. Patient reports similar symptoms. Concerned of strep.    Review of Systems   Constitutional: Negative.  Negative for fever and malaise/fatigue.   HENT:  Positive for congestion and sore throat.    Respiratory:  Positive for cough. Negative for shortness of breath.    All other systems reviewed and are negative.    Refer to HPI for additional details.    During this visit, appropriate PPE was worn, hand hygiene was performed, and the patient and any visitors were masked.    PMH:  has a past medical history of Anxiety, Grade I hemorrhoids (2018), Insomnia, Migraine, No known health problems, Pain related to vaginal delivery (2019), and  (spontaneous vaginal delivery) (2019).    MEDS:   Current Outpatient Medications:     amoxicillin (AMOXIL) 500 MG Cap, Take 1 Capsule by mouth 2 times a day for 10 days., Disp: 20 Capsule, Rfl: 0    lidocaine (XYLOCAINE) 2 % Solution, Take 15 mL by mouth every 3 hours as needed for Throat/Mouth Pain. Swish/gargle well, then spit out, Disp: 100 mL, Rfl: 0    albuterol 108 (90 Base) MCG/ACT Aero Soln inhalation aerosol, Inhale 2 Puffs every 6 hours as needed for Shortness of Breath., Disp: 8.5 g, Rfl: 0    montelukast (SINGULAIR) 10 MG Tab, Take 1 Tablet by mouth every day., Disp: 90 Tablet, Rfl: 1    methylPREDNISolone (MEDROL DOSEPAK) 4 MG Tablet Therapy Pack, Follow schedule on package instructions. (Patient  "not taking: Reported on 2/12/2023), Disp: 21 Tablet, Rfl: 0    benzonatate (TESSALON) 100 MG Cap, Take 1 Capsule by mouth 3 times a day as needed for Cough. (Patient not taking: Reported on 2/12/2023), Disp: 60 Capsule, Rfl: 0    ALLERGIES:   Allergies   Allergen Reactions    Meat Extract      Pt eats VEGAN diet, NO MEAT     SURGHX:   Past Surgical History:   Procedure Laterality Date    OTHER      cyst removed    OTHER      not sure, soft tissue or skin lesion      SOCHX:  reports that she has never smoked. She has never used smokeless tobacco. She reports that she does not drink alcohol and does not use drugs.    FH: Per HPI as applicable/pertinent.      Objective:     /60 (BP Location: Right arm, Patient Position: Sitting, BP Cuff Size: Adult long)   Pulse 78   Temp 36.5 °C (97.7 °F) (Temporal)   Resp 18   Ht 1.6 m (5' 3\")   Wt 59.9 kg (132 lb)   SpO2 97%   BMI 23.38 kg/m²     Physical Exam  Nursing note reviewed.   Constitutional:       General: She is not in acute distress.     Appearance: She is well-developed. She is not ill-appearing or toxic-appearing.   HENT:      Head: Normocephalic.      Right Ear: External ear normal.      Left Ear: External ear normal.      Nose: Nose normal.      Mouth/Throat:      Mouth: Mucous membranes are moist.      Pharynx: Oropharynx is clear. Uvula midline. Posterior oropharyngeal erythema present.   Eyes:      General: Vision grossly intact.      Extraocular Movements: Extraocular movements intact.      Conjunctiva/sclera: Conjunctivae normal.   Cardiovascular:      Rate and Rhythm: Normal rate.   Pulmonary:      Effort: Pulmonary effort is normal. No respiratory distress.      Comments: Phonation normal, speaks in full sentences, no audible wheeze or stridor   Musculoskeletal:         General: No deformity. Normal range of motion.      Cervical back: Normal range of motion and neck supple.   Lymphadenopathy:      Cervical: Cervical adenopathy present.   Skin:    "  General: Skin is warm and dry.      Coloration: Skin is not pale.   Neurological:      Mental Status: She is alert and oriented to person, place, and time.      Motor: No weakness.   Psychiatric:         Behavior: Behavior normal. Behavior is cooperative.       Assessment/Plan:     1. Sore throat  - lidocaine (XYLOCAINE) 2 % Solution; Take 15 mL by mouth every 3 hours as needed for Throat/Mouth Pain. Swish/gargle well, then spit out  Dispense: 100 mL; Refill: 0    2. Exposure to strep throat  - amoxicillin (AMOXIL) 500 MG Cap; Take 1 Capsule by mouth 2 times a day for 10 days.  Dispense: 20 Capsule; Refill: 0    Differential diagnosis, natural history, supportive care, rest, fluids, over-the-counter symptom management per 's instructions, ibuprofen, APAP, close monitoring, and indications for immediate follow-up discussed.     Vital signs stable, afebrile, no acute distress at this time. Warning signs reviewed. Return precautions discussed.     All questions answered. Patient agrees with the plan of care.    [facility]    Work note provided.

## 2023-04-23 ENCOUNTER — OFFICE VISIT (OUTPATIENT)
Dept: URGENT CARE | Facility: CLINIC | Age: 38
End: 2023-04-23
Payer: COMMERCIAL

## 2023-04-23 VITALS
BODY MASS INDEX: 23.04 KG/M2 | OXYGEN SATURATION: 97 % | TEMPERATURE: 98.3 F | HEIGHT: 63 IN | HEART RATE: 68 BPM | DIASTOLIC BLOOD PRESSURE: 56 MMHG | RESPIRATION RATE: 16 BRPM | WEIGHT: 130 LBS | SYSTOLIC BLOOD PRESSURE: 94 MMHG

## 2023-04-23 DIAGNOSIS — J45.901 ASTHMA WITH ACUTE EXACERBATION, UNSPECIFIED ASTHMA SEVERITY, UNSPECIFIED WHETHER PERSISTENT: ICD-10-CM

## 2023-04-23 PROCEDURE — 99214 OFFICE O/P EST MOD 30 MIN: CPT

## 2023-04-23 RX ORDER — DEXTROMETHORPHAN HYDROBROMIDE AND PROMETHAZINE HYDROCHLORIDE 15; 6.25 MG/5ML; MG/5ML
5 SYRUP ORAL EVERY 6 HOURS PRN
Qty: 118 ML | Refills: 0 | Status: SHIPPED | OUTPATIENT
Start: 2023-04-23 | End: 2023-04-30

## 2023-04-23 RX ORDER — ALBUTEROL SULFATE 90 UG/1
2 AEROSOL, METERED RESPIRATORY (INHALATION) EVERY 6 HOURS PRN
Qty: 8.5 G | Refills: 0 | Status: SHIPPED | OUTPATIENT
Start: 2023-04-23

## 2023-04-23 RX ORDER — PREDNISONE 20 MG/1
40 TABLET ORAL DAILY
Qty: 10 TABLET | Refills: 0 | Status: SHIPPED | OUTPATIENT
Start: 2023-04-23 | End: 2023-04-28

## 2023-04-23 ASSESSMENT — FIBROSIS 4 INDEX: FIB4 SCORE: 0.46

## 2023-04-23 NOTE — LETTER
April 23, 2023    To Whom It May Concern:         This is confirmation that Lela Willard Olivas attended her scheduled appointment with VARUN Youngblood on 4/23/23. Please excuse her from work 4/24/23-4/26/23.         If you have any questions please do not hesitate to call me at the phone number listed below.    Sincerely,          Jailyn Castellanos A.P.R.N.  [phone]

## 2023-04-24 ASSESSMENT — ENCOUNTER SYMPTOMS
WHEEZING: 1
DIAPHORESIS: 0
FEVER: 0
SPUTUM PRODUCTION: 0
COUGH: 1
SORE THROAT: 0
WEIGHT LOSS: 0
SHORTNESS OF BREATH: 1
CHILLS: 0
STRIDOR: 0
SINUS PAIN: 0

## 2023-04-24 NOTE — PROGRESS NOTES
Subjective:   Lela Olivas is a 37 y.o. female who presents for Other (States that she feels lungs have water in them. Condition has now worsen. )      HPI: This is a 37-year-old female who presents today for cough, shortness of breath, and wheezing.  This is a new problem.  Patient reports developing symptoms 2 days ago.  She reports cough and shortness of breath is worse in the morning.  She does report underlying history of asthma.  Patient reports that she has an appointment this coming week with pulmonologist.  She has been taking Tessalon and has used her albuterol inhaler which has been somewhat helpful.  She denies fevers, chills, body aches.  No sick contacts.      Review of Systems   Constitutional:  Negative for chills, diaphoresis, fever, malaise/fatigue and weight loss.   HENT:  Negative for congestion, ear discharge, ear pain, sinus pain and sore throat.    Respiratory:  Positive for cough, shortness of breath and wheezing. Negative for sputum production and stridor.    All other systems reviewed and are negative.    Medications:    Current Outpatient Medications on File Prior to Visit   Medication Sig Dispense Refill    lidocaine (XYLOCAINE) 2 % Solution Take 15 mL by mouth every 3 hours as needed for Throat/Mouth Pain. Swish/gargle well, then spit out 100 mL 0    albuterol 108 (90 Base) MCG/ACT Aero Soln inhalation aerosol Inhale 2 Puffs every 6 hours as needed for Shortness of Breath. 8.5 g 0    montelukast (SINGULAIR) 10 MG Tab Take 1 Tablet by mouth every day. 90 Tablet 1     No current facility-administered medications on file prior to visit.        Allergies:   Meat extract    Problem List:   Patient Active Problem List   Diagnosis    Weight loss    Menorrhagia with irregular cycle    Anxiety    Environmental and seasonal allergies    Encounter to establish care    Shortness of breath    Burning sensation        Surgical History:  Past Surgical History:   Procedure Laterality Date     "OTHER      cyst removed    OTHER      not sure, soft tissue or skin lesion        Past Social Hx:   Social History     Tobacco Use    Smoking status: Never    Smokeless tobacco: Never   Vaping Use    Vaping Use: Never used   Substance Use Topics    Alcohol use: No    Drug use: No          Problem list, medications, and allergies reviewed by myself today in Epic.     Objective:     BP 94/56 (BP Location: Left arm, Patient Position: Sitting, BP Cuff Size: Adult)   Pulse 68   Temp 36.8 °C (98.3 °F) (Temporal)   Resp 16   Ht 1.6 m (5' 3\")   Wt 59 kg (130 lb)   SpO2 97%   BMI 23.03 kg/m²     Physical Exam  Vitals and nursing note reviewed.   Constitutional:       General: She is not in acute distress.     Appearance: Normal appearance. She is normal weight. She is not ill-appearing, toxic-appearing or diaphoretic.   HENT:      Head: Normocephalic and atraumatic.      Right Ear: Tympanic membrane, ear canal and external ear normal. There is no impacted cerumen.      Left Ear: Tympanic membrane, ear canal and external ear normal. There is no impacted cerumen.      Nose: Nose normal. No congestion or rhinorrhea.      Mouth/Throat:      Mouth: Mucous membranes are moist.      Pharynx: Oropharynx is clear. No oropharyngeal exudate or posterior oropharyngeal erythema.   Cardiovascular:      Rate and Rhythm: Normal rate and regular rhythm.      Pulses: Normal pulses.      Heart sounds: Normal heart sounds. No murmur heard.    No friction rub. No gallop.   Pulmonary:      Effort: Pulmonary effort is normal. No respiratory distress.      Breath sounds: Normal breath sounds. No stridor. No wheezing, rhonchi or rales.   Chest:      Chest wall: No tenderness.   Musculoskeletal:      Cervical back: Neck supple. No tenderness.   Lymphadenopathy:      Cervical: No cervical adenopathy.   Skin:     General: Skin is warm and dry.      Capillary Refill: Capillary refill takes less than 2 seconds.   Neurological:      General: No " focal deficit present.      Mental Status: She is alert and oriented to person, place, and time. Mental status is at baseline.      Gait: Gait normal.   Psychiatric:         Mood and Affect: Mood normal.         Behavior: Behavior normal.         Thought Content: Thought content normal.         Judgment: Judgment normal.       Assessment/Plan:     Diagnosis and associated orders:   1. Asthma with acute exacerbation, unspecified asthma severity, unspecified whether persistent  predniSONE (DELTASONE) 20 MG Tab    albuterol 108 (90 Base) MCG/ACT Aero Soln inhalation aerosol    promethazine-dextromethorphan (PROMETHAZINE-DM) 6.25-15 MG/5ML syrup             Comments/MDM:   Pt is clinically stable at today's acute urgent care visit.  No acute distress noted. Appropriate for outpatient management at this time.   Acute on chronic problem.  Patient's symptoms and physical exam findings consistent with acute exacerbation of asthma.  Patient be treated with prednisone 40 mg daily, albuterol inhaler as needed for wheezing and shortness of breath, and promethazine cough syrup.  Advised patient to begin taking these medications as prescribed.  Advised patient to use caution with promethazine cough syrup secondary to sedative effects.  Advised patient to follow-up with PCP and pulmonologist as scheduled.  She is to return for any new or worsening signs or symptoms.  Patient is agreeable this plan of care verbalizes good understanding.           Discussed DDx, management options (risks,benefits, and alternatives to planned treatment), natural progression and supportive care.  Expressed understanding and the treatment plan was agreed upon. Questions were encouraged and answered   Return to urgent care prn if new or worsening sx or if there is no improvement in condition prn.    Educated in Red flags and indications to immediately call 911 or present to the Emergency Department.   Advised the patient to follow-up with the primary  care physician for recheck, reevaluation, and consideration of further management.    I personally reviewed prior external notes and test results pertinent to today's visit.  I have independently reviewed and interpreted all diagnostics ordered during this urgent care acute visit.       Please note that this dictation was created using voice recognition software. I have made a reasonable attempt to correct obvious errors, but I expect that there are errors of grammar and possibly content that I did not discover before finalizing the note.    This note was electronically signed by VENICE Wright

## 2023-04-26 ENCOUNTER — OFFICE VISIT (OUTPATIENT)
Dept: MEDICAL GROUP | Facility: PHYSICIAN GROUP | Age: 38
End: 2023-04-26
Payer: COMMERCIAL

## 2023-04-26 VITALS
DIASTOLIC BLOOD PRESSURE: 58 MMHG | SYSTOLIC BLOOD PRESSURE: 102 MMHG | BODY MASS INDEX: 23.99 KG/M2 | RESPIRATION RATE: 16 BRPM | HEART RATE: 80 BPM | TEMPERATURE: 98.2 F | WEIGHT: 135.4 LBS | HEIGHT: 63 IN | OXYGEN SATURATION: 99 %

## 2023-04-26 DIAGNOSIS — J30.89 ENVIRONMENTAL AND SEASONAL ALLERGIES: ICD-10-CM

## 2023-04-26 DIAGNOSIS — R06.02 SHORTNESS OF BREATH: ICD-10-CM

## 2023-04-26 DIAGNOSIS — J45.41 MODERATE PERSISTENT ASTHMA WITH ACUTE EXACERBATION: ICD-10-CM

## 2023-04-26 PROCEDURE — 99214 OFFICE O/P EST MOD 30 MIN: CPT | Performed by: NURSE PRACTITIONER

## 2023-04-26 RX ORDER — MONTELUKAST SODIUM 10 MG/1
10 TABLET ORAL DAILY
Qty: 90 TABLET | Refills: 2 | Status: SHIPPED | OUTPATIENT
Start: 2023-04-26

## 2023-04-26 RX ORDER — BUDESONIDE AND FORMOTEROL FUMARATE DIHYDRATE 80; 4.5 UG/1; UG/1
2 AEROSOL RESPIRATORY (INHALATION) 2 TIMES DAILY
Qty: 6.9 G | Refills: 1 | Status: SHIPPED | OUTPATIENT
Start: 2023-04-26

## 2023-04-26 ASSESSMENT — PATIENT HEALTH QUESTIONNAIRE - PHQ9: CLINICAL INTERPRETATION OF PHQ2 SCORE: 0

## 2023-04-26 ASSESSMENT — FIBROSIS 4 INDEX: FIB4 SCORE: 0.46

## 2023-04-26 NOTE — LETTER
April 26, 2023    To Whom It May Concern:         This is confirmation that Lela Olivas attended her scheduled appointment with VARUN Tilley on 4/26/23. She may need frequent breaks at work as she is dealing with an exacerbation with her medical condition.         If you have any questions please do not hesitate to call me at the phone number listed below.    Sincerely,          YRN TilleyRDrewN.  [phone]

## 2023-04-26 NOTE — PROGRESS NOTES
Subjective  Chief Complaint  Shortness of Breath    History of Present Illness  Lela Olvias is a 37 y.o. female. This established patient is here today to discuss her shortness of breath.    Shortness of breath  Acute and ongoing. She has been dealing shortness of breath for a few weeks now. She states that it will come and go at time. She did go to the Urgent Care this week and was started on Prednisone and a cough medicine. She states that she still has the feeling of shortness of breath.    Past Medical History    Allergies: Meat extract  Past Medical History:   Diagnosis Date    Anxiety     Grade I hemorrhoids 2018    Insomnia     Migraine     No known health problems     Pain related to vaginal delivery 2019     (spontaneous vaginal delivery) 2019     Past Surgical History:   Procedure Laterality Date    OTHER      cyst removed    OTHER      not sure, soft tissue or skin lesion      Current Outpatient Medications Ordered in Epic   Medication Sig Dispense Refill    montelukast (SINGULAIR) 10 MG Tab Take 1 Tablet by mouth every day. 90 Tablet 2    budesonide-formoterol (SYMBICORT) 80-4.5 MCG/ACT Aerosol Inhale 2 Puffs 2 times a day. 6.9 g 1    predniSONE (DELTASONE) 20 MG Tab Take 2 Tablets by mouth every day for 5 days. 10 Tablet 0    albuterol 108 (90 Base) MCG/ACT Aero Soln inhalation aerosol Inhale 2 Puffs every 6 hours as needed for Shortness of Breath. 8.5 g 0    promethazine-dextromethorphan (PROMETHAZINE-DM) 6.25-15 MG/5ML syrup Take 5 mL by mouth every 6 hours as needed for Cough for up to 7 days. (caution: may cause sedation) 118 mL 0    lidocaine (XYLOCAINE) 2 % Solution Take 15 mL by mouth every 3 hours as needed for Throat/Mouth Pain. Swish/gargle well, then spit out 100 mL 0    albuterol 108 (90 Base) MCG/ACT Aero Soln inhalation aerosol Inhale 2 Puffs every 6 hours as needed for Shortness of Breath. 8.5 g 0     No current Epic-ordered facility-administered medications on  "file.     Family History:    Family History   Problem Relation Age of Onset    Psychiatric Illness Mother         depression    Diabetes Father     Cancer Maternal Uncle         lung cancer, smoker    Cancer Paternal Grandfather         brain cancer    Heart Disease Maternal Uncle         MI    Stroke Neg Hx     Alcohol/Drug Neg Hx       Personal/Social History:    Social History     Tobacco Use    Smoking status: Never    Smokeless tobacco: Never   Vaping Use    Vaping Use: Never used   Substance Use Topics    Alcohol use: No    Drug use: No     Social History     Social History Narrative    Not on file      Review of Systems:   General: Negative for fever/chills and unexpected weight change.   Respiratory:  Negative for cough. Positive for dyspnea.  Cardiovascular:  Negative for chest pain and palpitations.  Musculoskeletal:  Negative for myalgias.   Skin:  Negative for rash.     Objective  Physical Exam:   /58 (BP Location: Right arm, Patient Position: Sitting, BP Cuff Size: Adult)   Pulse 80   Temp 36.8 °C (98.2 °F) (Temporal)   Resp 16   Ht 1.6 m (5' 3\")   Wt 61.4 kg (135 lb 6.4 oz)   SpO2 99%  Body mass index is 23.99 kg/m².  General:  Alert and oriented.  Well appearing.  NAD  Neck: Supple without JVD. No lymphadenopathy.  Pulmonary:  Normal effort.  Clear to ausculation without rales, ronchi, or wheezing.  Cardiovascular:  Regular rate and rhythm without murmur, rubs or gallop.   Skin:  Warm and dry.  No obvious lesions.  Musculoskeletal:  No extremity cyanosis, clubbing, or edema.      Assessment/Plan  1. Shortness of breath  2. Moderate persistent asthma with acute exacerbation  Acute and ongoing.  Discussed the importance of taking her medications for asthma as this happened last year when she stopped taking her medications, she verbalized understanding.  Discussed Symbicort risks, benefits and side effects, she verbalized understanding.  Discussed doing PFT before she sees Pulmonary, she is " agreeable.  - budesonide-formoterol (SYMBICORT) 80-4.5 MCG/ACT Aerosol; Inhale 2 Puffs 2 times a day.  Dispense: 6.9 g; Refill: 1  - PULMONARY FUNCTION TESTS -Test requested: Complete Pulmonary Function Test; Include MIPS/MEPS? Yes; Future    3. Environmental and seasonal allergies  Chronic and ongoing.  Continue to take Montelukast 10 mg daily.  - montelukast (SINGULAIR) 10 MG Tab; Take 1 Tablet by mouth every day.  Dispense: 90 Tablet; Refill: 2      Health Maintenance: Completed    Return if symptoms worsen or fail to improve.    Discussed that the patient carries some responsibility in management of their health care.    Please note that this dictation was created using voice recognition software. I have made every reasonable attempt to correct obvious errors, but I expect that there are errors of grammar and possibly content that I did not discover before finalizing the note.    VENICE Coughlin  [facility]

## 2023-04-26 NOTE — ASSESSMENT & PLAN NOTE
Acute and ongoing. She has been dealing shortness of breath for a few weeks now. She states that it will come and go at time. She did go to the Urgent Care this week and was started on Prednisone and a cough medicine. She states that she still has the feeling of shortness of breath.

## 2023-04-26 NOTE — LETTER
April 26, 2023    To Whom It May Concern:         This is confirmation that Lela Olivas attended her scheduled appointment with VARUN Tilley on 4/26/23. Please excuse her from work on 4/26/23 to 4/28/23 as she is dealing with an acute illness.         If you have any questions please do not hesitate to call me at the phone number listed below.    Sincerely,          YRN TilleyRDrewN.  [phone]

## 2023-06-10 ENCOUNTER — APPOINTMENT (OUTPATIENT)
Dept: PULMONOLOGY | Facility: MEDICAL CENTER | Age: 38
End: 2023-06-10
Attending: NURSE PRACTITIONER
Payer: COMMERCIAL